# Patient Record
Sex: MALE
[De-identification: names, ages, dates, MRNs, and addresses within clinical notes are randomized per-mention and may not be internally consistent; named-entity substitution may affect disease eponyms.]

---

## 2017-01-24 ENCOUNTER — HOSPITAL (OUTPATIENT)
Dept: OTHER | Age: 57
End: 2017-01-24
Attending: FAMILY MEDICINE

## 2017-05-24 ENCOUNTER — DIAGNOSTIC TRANS (OUTPATIENT)
Dept: OTHER | Age: 57
End: 2017-05-24

## 2017-05-24 ENCOUNTER — HOSPITAL (OUTPATIENT)
Dept: OTHER | Age: 57
End: 2017-05-24
Attending: FAMILY MEDICINE

## 2017-05-24 LAB
25(OH)D3+25(OH)D2 SERPL-MCNC: 25.4 NG/ML (ref 30–100)
ALBUMIN SERPL-MCNC: 3.8 GM/DL (ref 3.6–5.1)
ALBUMIN/GLOB SERPL: 1 {RATIO} (ref 1–2.4)
ALP SERPL-CCNC: 85 UNIT/L (ref 45–117)
ALT SERPL-CCNC: 35 UNIT/L
ANALYZER ANC (IANC): NORMAL
ANION GAP SERPL CALC-SCNC: 14 MMOL/L (ref 10–20)
APPEARANCE UR: CLEAR
AST SERPL-CCNC: 16 UNIT/L
BASOPHILS # BLD: 0 THOUSAND/MCL (ref 0–0.3)
BASOPHILS NFR BLD: 0 %
BILIRUB SERPL-MCNC: 0.5 MG/DL (ref 0.2–1)
BILIRUB UR QL: NEGATIVE
BUN SERPL-MCNC: 14 MG/DL (ref 6–20)
BUN/CREAT SERPL: 18 (ref 7–25)
CALCIUM SERPL-MCNC: 9 MG/DL (ref 8.4–10.2)
CHLORIDE: 100 MMOL/L (ref 98–107)
CHOLEST SERPL-MCNC: 207 MG/DL
CHOLEST/HDLC SERPL: 6.5 {RATIO}
CO2 SERPL-SCNC: 29 MMOL/L (ref 21–32)
COLOR UR: ABNORMAL
CREAT 24H UR-MRATE: 312 MG/DL
CREAT SERPL-MCNC: 0.79 MG/DL (ref 0.67–1.17)
DIFFERENTIAL METHOD BLD: NORMAL
EOSINOPHIL # BLD: 0.4 THOUSAND/MCL (ref 0.1–0.5)
EOSINOPHIL NFR BLD: 4 %
ERYTHROCYTE [DISTWIDTH] IN BLOOD: 13.8 % (ref 11–15)
GLOBULIN SER-MCNC: 4 GM/DL (ref 2–4)
GLUCOSE SERPL-MCNC: 171 MG/DL (ref 65–99)
GLUCOSE UR-MCNC: 100 MG/DL
GLYCOHEMOGLOBIN: 7.1 % (ref 4.5–5.6)
HDLC SERPL-MCNC: 32 MG/DL
HEMATOCRIT: 46.4 % (ref 39–51)
HGB BLD-MCNC: 15.3 GM/DL (ref 13–17)
HGB UR QL: NEGATIVE
KETONES UR-MCNC: NEGATIVE MG/DL
LDLC SERPL CALC-MCNC: 124 MG/DL
LEUKOCYTE ESTERASE UR QL STRIP: NEGATIVE
LYMPHOCYTES # BLD: 2.3 THOUSAND/MCL (ref 1–4)
LYMPHOCYTES NFR BLD: 27 %
MAGNESIUM SERPL-MCNC: 2.2 MG/DL (ref 1.7–2.4)
MCH RBC QN AUTO: 32.8 PG (ref 26–34)
MCHC RBC AUTO-ENTMCNC: 33 GM/DL (ref 32–36.5)
MCV RBC AUTO: 99.6 FL (ref 78–100)
MICROALBUMIN UR-MCNC: 9.36 MG/DL
MICROALBUMIN/CREAT UR: 30 MCG/MG
MICROSCOPIC (MT): ABNORMAL
MONOCYTES # BLD: 0.7 THOUSAND/MCL (ref 0.3–0.9)
MONOCYTES NFR BLD: 9 %
NEUTROPHILS # BLD: 5 THOUSAND/MCL (ref 1.8–7.7)
NEUTROPHILS NFR BLD: 60 %
NEUTS SEG NFR BLD: NORMAL %
NITRITE UR QL: NEGATIVE
NONHDLC SERPL-MCNC: 175 MG/DL
PERCENT NRBC: NORMAL
PH UR: 5.5 UNIT (ref 5–7)
PHOSPHATE SERPL-MCNC: 3.6 MG/DL (ref 2.4–4.7)
PLATELET # BLD: 309 THOUSAND/MCL (ref 140–450)
POTASSIUM SERPL-SCNC: 4 MMOL/L (ref 3.4–5.1)
PROT SERPL-MCNC: 7.8 GM/DL (ref 6.4–8.2)
PROT UR QL: ABNORMAL MG/DL
PSA SERPL-MCNC: 0.31 NG/ML
RBC # BLD: 4.66 MILLION/MCL (ref 4.5–5.9)
SODIUM SERPL-SCNC: 139 MMOL/L (ref 135–145)
SP GR UR: 1.03 (ref 1–1.03)
SPECIMEN SOURCE: ABNORMAL
T3 SERPL-MCNC: 1.04 NG/ML (ref 0.6–1.81)
T4 SERPL-MCNC: 9.7 MCG/DL (ref 4.7–13.3)
TRIGLYCERIDE (TRIGP): 256 MG/DL
TSH SERPL-ACNC: 1.85 MCUNIT/ML (ref 0.35–5)
URNS CMNT MICRO: ABNORMAL
UROBILINOGEN UR QL: 0.2 MG/DL (ref 0–1)
WBC # BLD: 8.4 THOUSAND/MCL (ref 4.2–11)

## 2017-06-20 ENCOUNTER — IMAGING SERVICES (OUTPATIENT)
Dept: OTHER | Age: 57
End: 2017-06-20

## 2017-06-20 ENCOUNTER — HOSPITAL (OUTPATIENT)
Dept: OTHER | Age: 57
End: 2017-06-20

## 2017-06-20 ENCOUNTER — DIAGNOSTIC TRANS (OUTPATIENT)
Dept: OTHER | Age: 57
End: 2017-06-20

## 2017-12-22 ENCOUNTER — DIAGNOSTIC TRANS (OUTPATIENT)
Dept: OTHER | Age: 57
End: 2017-12-22

## 2017-12-22 ENCOUNTER — HOSPITAL (OUTPATIENT)
Dept: OTHER | Age: 57
End: 2017-12-22
Attending: FAMILY MEDICINE

## 2017-12-22 LAB
ALBUMIN SERPL-MCNC: 3.8 GM/DL (ref 3.6–5.1)
ALBUMIN/GLOB SERPL: 0.9 {RATIO} (ref 1–2.4)
ALP SERPL-CCNC: 81 UNIT/L (ref 45–117)
ALT SERPL-CCNC: 42 UNIT/L
ANALYZER ANC (IANC): ABNORMAL
ANION GAP SERPL CALC-SCNC: 11 MMOL/L (ref 10–20)
APPEARANCE UR: CLEAR
AST SERPL-CCNC: 19 UNIT/L
BILIRUB SERPL-MCNC: 0.3 MG/DL (ref 0.2–1)
BILIRUB UR QL: NEGATIVE
BUN SERPL-MCNC: 20 MG/DL (ref 6–20)
BUN/CREAT SERPL: 32 (ref 7–25)
CALCIUM SERPL-MCNC: 8.8 MG/DL (ref 8.4–10.2)
CHLORIDE: 104 MMOL/L (ref 98–107)
CHOLEST SERPL-MCNC: 246 MG/DL
CHOLEST/HDLC SERPL: 10.3 {RATIO}
CO2 SERPL-SCNC: 28 MMOL/L (ref 21–32)
COLOR UR: YELLOW
CREAT 24H UR-MRATE: 128 MG/DL
CREAT SERPL-MCNC: 0.62 MG/DL (ref 0.67–1.17)
ERYTHROCYTE [DISTWIDTH] IN BLOOD: 13.1 % (ref 11–15)
GLOBULIN SER-MCNC: 4.1 GM/DL (ref 2–4)
GLUCOSE SERPL-MCNC: 201 MG/DL (ref 65–99)
GLUCOSE UR-MCNC: >1000 MG/DL
GLYCOHEMOGLOBIN: 7.8 % (ref 4.5–5.6)
HDLC SERPL-MCNC: 24 MG/DL
HEMATOCRIT: 42.7 % (ref 39–51)
HGB BLD-MCNC: 14.5 GM/DL (ref 13–17)
HGB UR QL: NEGATIVE
KETONES UR-MCNC: NEGATIVE MG/DL
LDLC SERPL CALC-MCNC: ABNORMAL MG/DL
LEUKOCYTE ESTERASE UR QL STRIP: NEGATIVE
MCH RBC QN AUTO: 33.8 PG (ref 26–34)
MCHC RBC AUTO-ENTMCNC: 34 GM/DL (ref 32–36.5)
MCV RBC AUTO: 99.5 FL (ref 78–100)
MICROALBUMIN UR-MCNC: 0.81 MG/DL
MICROALBUMIN/CREAT UR: 6.3 MCG/MG
MICROSCOPIC (MT): ABNORMAL
NITRITE UR QL: NEGATIVE
NONHDLC SERPL-MCNC: 222 MG/DL
PH UR: 5.5 UNIT (ref 5–7)
PLATELET # BLD: 315 THOUSAND/MCL (ref 140–450)
POTASSIUM SERPL-SCNC: 4.5 MMOL/L (ref 3.4–5.1)
PROT SERPL-MCNC: 7.9 GM/DL (ref 6.4–8.2)
PROT UR QL: NEGATIVE MG/DL
RBC # BLD: 4.29 MILLION/MCL (ref 4.5–5.9)
SODIUM SERPL-SCNC: 138 MMOL/L (ref 135–145)
SP GR UR: 1.03 (ref 1–1.03)
SPECIMEN SOURCE: ABNORMAL
TRIGLYCERIDE (TRIGP): 552 MG/DL
URNS CMNT MICRO: ABNORMAL
UROBILINOGEN UR QL: 0.2 MG/DL (ref 0–1)
WBC # BLD: 9.2 THOUSAND/MCL (ref 4.2–11)

## 2018-04-23 ENCOUNTER — HOSPITAL (OUTPATIENT)
Dept: OTHER | Age: 58
End: 2018-04-23
Attending: FAMILY MEDICINE

## 2018-04-23 LAB
ANION GAP SERPL CALC-SCNC: 8 MMOL/L (ref 10–20)
BUN SERPL-MCNC: 16 MG/DL (ref 6–20)
BUN/CREAT SERPL: 26 (ref 7–25)
CALCIUM SERPL-MCNC: 8.9 MG/DL (ref 8.4–10.2)
CHLORIDE: 101 MMOL/L (ref 98–107)
CO2 SERPL-SCNC: 30 MMOL/L (ref 21–32)
CREAT SERPL-MCNC: 0.61 MG/DL (ref 0.67–1.17)
GLUCOSE SERPL-MCNC: 176 MG/DL (ref 65–99)
GLYCOHEMOGLOBIN: 8.6 % (ref 4.5–5.6)
POTASSIUM SERPL-SCNC: 3.8 MMOL/L (ref 3.4–5.1)
SODIUM SERPL-SCNC: 135 MMOL/L (ref 135–145)

## 2018-04-27 ENCOUNTER — HOSPITAL (OUTPATIENT)
Dept: OTHER | Age: 58
End: 2018-04-27
Attending: FAMILY MEDICINE

## 2018-05-31 ENCOUNTER — HOSPITAL (OUTPATIENT)
Dept: OTHER | Age: 58
End: 2018-05-31
Attending: FAMILY MEDICINE

## 2018-05-31 LAB
ALBUMIN SERPL-MCNC: 3.7 GM/DL (ref 3.6–5.1)
ALBUMIN/GLOB SERPL: 0.9 {RATIO} (ref 1–2.4)
ALP SERPL-CCNC: 81 UNIT/L (ref 45–117)
ALT SERPL-CCNC: 36 UNIT/L
ANION GAP SERPL CALC-SCNC: 11 MMOL/L (ref 10–20)
APPEARANCE UR: CLEAR
AST SERPL-CCNC: 18 UNIT/L
BILIRUB SERPL-MCNC: 0.7 MG/DL (ref 0.2–1)
BILIRUB UR QL: NEGATIVE
BUN SERPL-MCNC: 10 MG/DL (ref 6–20)
BUN/CREAT SERPL: 16 (ref 7–25)
CALCIUM SERPL-MCNC: 9.3 MG/DL (ref 8.4–10.2)
CHLORIDE: 99 MMOL/L (ref 98–107)
CHOLEST SERPL-MCNC: 223 MG/DL
CHOLEST/HDLC SERPL: 8.3 {RATIO}
CO2 SERPL-SCNC: 30 MMOL/L (ref 21–32)
COLOR UR: YELLOW
CREAT 24H UR-MRATE: 105 MG/DL
CREAT SERPL-MCNC: 0.62 MG/DL (ref 0.67–1.17)
GLOBULIN SER-MCNC: 3.9 GM/DL (ref 2–4)
GLUCOSE SERPL-MCNC: 159 MG/DL (ref 65–99)
GLUCOSE UR-MCNC: 100 MG/DL
GLYCOHEMOGLOBIN: 7.8 % (ref 4.5–5.6)
HDLC SERPL-MCNC: 27 MG/DL
HGB UR QL: NEGATIVE
KETONES UR-MCNC: NEGATIVE MG/DL
LDLC SERPL CALC-MCNC: 133 MG/DL
LEUKOCYTE ESTERASE UR QL STRIP: NEGATIVE
MICROALBUMIN UR-MCNC: 0.76 MG/DL
MICROALBUMIN/CREAT UR: 7.2 MG/GM
MICROSCOPIC (MT): ABNORMAL
NITRITE UR QL: NEGATIVE
NONHDLC SERPL-MCNC: 196 MG/DL
PH UR: 6.5 UNIT (ref 5–7)
POTASSIUM SERPL-SCNC: 4.2 MMOL/L (ref 3.4–5.1)
PROT SERPL-MCNC: 7.6 GM/DL (ref 6.4–8.2)
PROT UR QL: NEGATIVE MG/DL
SODIUM SERPL-SCNC: 136 MMOL/L (ref 135–145)
SP GR UR: 1.02 (ref 1–1.03)
SPECIMEN SOURCE: ABNORMAL
TRIGLYCERIDE (TRIGP): 316 MG/DL
URNS CMNT MICRO: ABNORMAL
UROBILINOGEN UR QL: 0.2 MG/DL (ref 0–1)

## 2018-10-23 ENCOUNTER — HOSPITAL (OUTPATIENT)
Dept: OTHER | Age: 58
End: 2018-10-23
Attending: FAMILY MEDICINE

## 2018-10-23 LAB
ALBUMIN SERPL-MCNC: 3.7 GM/DL (ref 3.6–5.1)
ALBUMIN/GLOB SERPL: 0.9 {RATIO} (ref 1–2.4)
ALP SERPL-CCNC: 84 UNIT/L (ref 45–117)
ALT SERPL-CCNC: 29 UNIT/L
AMORPH SED URNS QL MICRO: ABNORMAL
ANION GAP SERPL CALC-SCNC: 3 MMOL/L (ref 10–20)
APPEARANCE UR: ABNORMAL
AST SERPL-CCNC: 14 UNIT/L
BACTERIA #/AREA URNS HPF: ABNORMAL /HPF
BILIRUB SERPL-MCNC: 0.7 MG/DL (ref 0.2–1)
BILIRUB UR QL: NEGATIVE
BUN SERPL-MCNC: 17 MG/DL (ref 6–20)
BUN/CREAT SERPL: 22 (ref 7–25)
CALCIUM SERPL-MCNC: 9.1 MG/DL (ref 8.4–10.2)
CAOX CRY URNS QL MICRO: ABNORMAL
CHLORIDE: 106 MMOL/L (ref 98–107)
CO2 SERPL-SCNC: 32 MMOL/L (ref 21–32)
COLOR UR: YELLOW
CREAT 24H UR-MRATE: 359 MG/DL
CREAT SERPL-MCNC: 0.77 MG/DL (ref 0.67–1.17)
EPITH CASTS #/AREA URNS LPF: ABNORMAL /[LPF]
FATTY CASTS #/AREA URNS LPF: ABNORMAL /[LPF]
GLOBULIN SER-MCNC: 3.9 GM/DL (ref 2–4)
GLUCOSE SERPL-MCNC: 175 MG/DL (ref 65–99)
GLUCOSE UR-MCNC: 150 MG/DL
GLYCOHEMOGLOBIN: 8.4 % (ref 4.5–5.6)
GRAN CASTS #/AREA URNS LPF: ABNORMAL /[LPF]
HGB UR QL: NEGATIVE
HYALINE CASTS #/AREA URNS LPF: ABNORMAL /LPF (ref 0–5)
KETONES UR-MCNC: ABNORMAL MG/DL
LEUKOCYTE ESTERASE UR QL STRIP: NEGATIVE
MICROALBUMIN UR-MCNC: 6.38 MG/DL
MICROALBUMIN/CREAT UR: 17.8 MG/GM
MIXED CELL CASTS #/AREA URNS LPF: ABNORMAL /[LPF]
MUCOUS THREADS URNS QL MICRO: PRESENT
NITRITE UR QL: NEGATIVE
PH UR: 5 UNIT (ref 5–7)
POTASSIUM SERPL-SCNC: 4.6 MMOL/L (ref 3.4–5.1)
PROT SERPL-MCNC: 7.6 GM/DL (ref 6.4–8.2)
PROT UR QL: 30 MG/DL
RBC #/AREA URNS HPF: ABNORMAL /HPF (ref 0–3)
RBC CASTS #/AREA URNS LPF: ABNORMAL /[LPF]
RENAL EPI CELLS #/AREA URNS HPF: ABNORMAL /[HPF]
SODIUM SERPL-SCNC: 136 MMOL/L (ref 135–145)
SP GR UR: 1.03 (ref 1–1.03)
SPECIMEN SOURCE: ABNORMAL
SPERM URNS QL MICRO: ABNORMAL
SQUAMOUS #/AREA URNS HPF: ABNORMAL /HPF (ref 0–5)
T VAGINALIS URNS QL MICRO: ABNORMAL
TRI-PHOS CRY URNS QL MICRO: ABNORMAL
URATE CRY URNS QL MICRO: ABNORMAL
UROBILINOGEN UR QL: 2 MG/DL (ref 0–1)
WAXY CASTS #/AREA URNS LPF: ABNORMAL /[LPF]
WBC #/AREA URNS HPF: ABNORMAL /HPF (ref 0–5)
WBC CASTS #/AREA URNS LPF: ABNORMAL /[LPF]
YEAST HYPHAE URNS QL MICRO: ABNORMAL
YEAST URNS QL MICRO: ABNORMAL

## 2019-01-11 ENCOUNTER — DIAGNOSTIC TRANS (OUTPATIENT)
Dept: OTHER | Age: 59
End: 2019-01-11

## 2019-01-11 ENCOUNTER — HOSPITAL (OUTPATIENT)
Dept: OTHER | Age: 59
End: 2019-01-11
Attending: FAMILY MEDICINE

## 2019-01-11 LAB
25(OH)D3+25(OH)D2 SERPL-MCNC: 13.9 NG/ML (ref 30–100)
25(OH)D3+25(OH)D2 SERPL-MCNC: 13.9 NG/ML (ref 30–100)
25(OH)D3+25(OH)D2 SERPL-MCNC: ABNORMAL NG/ML
ALBUMIN SERPL-MCNC: 3.8 G/DL (ref 3.6–5.1)
ALBUMIN SERPL-MCNC: 3.8 GM/DL (ref 3.6–5.1)
ALBUMIN/GLOB SERPL: 1 {RATIO} (ref 1–2.4)
ALBUMIN/GLOB SERPL: 1 {RATIO} (ref 1–2.4)
ALP SERPL-CCNC: 81 UNIT/L (ref 45–117)
ALP SERPL-CCNC: 81 UNITS/L (ref 45–117)
ALT SERPL-CCNC: 31 UNIT/L
ALT SERPL-CCNC: 31 UNITS/L
AMORPH SED URNS QL MICRO: ABNORMAL
AMORPH SED URNS QL MICRO: ABNORMAL
ANALYZER ANC (IANC): ABNORMAL
ANALYZER ANC (IANC): ABNORMAL
ANION GAP SERPL CALC-SCNC: 8 MMOL/L (ref 10–20)
ANION GAP SERPL CALC-SCNC: 8 MMOL/L (ref 10–20)
APPEARANCE UR: ABNORMAL
APPEARANCE UR: ABNORMAL
AST SERPL-CCNC: 14 UNIT/L
AST SERPL-CCNC: 14 UNITS/L
BACTERIA #/AREA URNS HPF: ABNORMAL /HPF
BACTERIA #/AREA URNS HPF: ABNORMAL /HPF
BILIRUB SERPL-MCNC: 0.5 MG/DL (ref 0.2–1)
BILIRUB SERPL-MCNC: 0.5 MG/DL (ref 0.2–1)
BILIRUB UR QL: NEGATIVE
BILIRUB UR QL: NEGATIVE
BUN SERPL-MCNC: 14 MG/DL (ref 6–20)
BUN SERPL-MCNC: 14 MG/DL (ref 6–20)
BUN/CREAT SERPL: 21 (ref 7–25)
BUN/CREAT SERPL: 21 (ref 7–25)
CALCIUM SERPL-MCNC: 8.7 MG/DL (ref 8.4–10.2)
CALCIUM SERPL-MCNC: 8.7 MG/DL (ref 8.4–10.2)
CAOX CRY URNS QL MICRO: ABNORMAL
CAOX CRY URNS QL MICRO: ABNORMAL
CHLORIDE SERPL-SCNC: 105 MMOL/L (ref 98–107)
CHLORIDE: 105 MMOL/L (ref 98–107)
CHOLEST SERPL-MCNC: 212 MG/DL
CHOLEST SERPL-MCNC: 212 MG/DL
CHOLEST SERPL-MCNC: ABNORMAL MG/DL
CHOLEST/HDLC SERPL: 7.1 {RATIO}
CHOLEST/HDLC SERPL: 7.1 {RATIO}
CO2 SERPL-SCNC: 30 MMOL/L (ref 21–32)
CO2 SERPL-SCNC: 30 MMOL/L (ref 21–32)
COLOR UR: ABNORMAL
COLOR UR: ABNORMAL
CREAT 24H UR-MRATE: 525 MG/DL
CREAT SERPL-MCNC: 0.66 MG/DL (ref 0.67–1.17)
CREAT SERPL-MCNC: 0.66 MG/DL (ref 0.67–1.17)
EPITH CASTS #/AREA URNS LPF: ABNORMAL /[LPF]
EPITH CASTS #/AREA URNS LPF: ABNORMAL /[LPF]
ERYTHROCYTE [DISTWIDTH] IN BLOOD: 12.4 % (ref 11–15)
ERYTHROCYTE [DISTWIDTH] IN BLOOD: 12.4 % (ref 11–15)
FATTY CASTS #/AREA URNS LPF: ABNORMAL /[LPF]
FATTY CASTS #/AREA URNS LPF: ABNORMAL /[LPF]
GLOBULIN SER-MCNC: 3.8 G/DL (ref 2–4)
GLOBULIN SER-MCNC: 3.8 GM/DL (ref 2–4)
GLUCOSE SERPL-MCNC: 175 MG/DL (ref 65–99)
GLUCOSE SERPL-MCNC: 175 MG/DL (ref 65–99)
GLUCOSE UR-MCNC: ABNORMAL MG/DL
GLUCOSE UR-MCNC: ABNORMAL MG/DL
GLYCOHEMOGLOBIN: 8.4 % (ref 4.5–5.6)
GRAN CASTS #/AREA URNS LPF: ABNORMAL /[LPF]
GRAN CASTS #/AREA URNS LPF: ABNORMAL /[LPF]
HBA1C MFR BLD: 10.0           24 %
HBA1C MFR BLD: 6.0            126 %
HBA1C MFR BLD: 6.5            14 %
HBA1C MFR BLD: 7.0            154 %
HBA1C MFR BLD: 7.5            169 %
HBA1C MFR BLD: 8.0            183 %
HBA1C MFR BLD: 8.4 % (ref 4.5–5.6)
HBA1C MFR BLD: 8.5            197 %
HBA1C MFR BLD: 9.0            212 %
HBA1C MFR BLD: 9.5            226 %
HBA1C MFR BLD: ABNORMAL %
HCT VFR BLD CALC: 42 % (ref 39–51)
HDLC SERPL-MCNC: 30 MG/DL
HDLC SERPL-MCNC: 30 MG/DL
HDLC SERPL-MCNC: ABNORMAL MG/DL
HEMATOCRIT: 42 % (ref 39–51)
HGB BLD-MCNC: 14.4 G/DL (ref 13–17)
HGB BLD-MCNC: 14.4 GM/DL (ref 13–17)
HGB UR QL: NEGATIVE
HGB UR QL: NEGATIVE
HYALINE CASTS #/AREA URNS LPF: ABNORMAL /LPF (ref 0–5)
HYALINE CASTS #/AREA URNS LPF: ABNORMAL /LPF (ref 0–5)
KETONES UR-MCNC: ABNORMAL MG/DL
KETONES UR-MCNC: ABNORMAL MG/DL
LDLC SERPL CALC-MCNC: 138 MG/DL
LDLC SERPL CALC-MCNC: 138 MG/DL
LDLC SERPL CALC-MCNC: ABNORMAL MG/DL
LEUKOCYTE ESTERASE UR QL STRIP: NEGATIVE
LEUKOCYTE ESTERASE UR QL STRIP: NEGATIVE
MCH RBC QN AUTO: 32.6 PG (ref 26–34)
MCH RBC QN AUTO: 32.6 PG (ref 26–34)
MCHC RBC AUTO-ENTMCNC: 34.3 G/DL (ref 32–36.5)
MCHC RBC AUTO-ENTMCNC: 34.3 GM/DL (ref 32–36.5)
MCV RBC AUTO: 95 FL (ref 78–100)
MCV RBC AUTO: 95 FL (ref 78–100)
MICROALBUMIN UR-MCNC: 15 MG/DL
MICROALBUMIN/CREAT UR: 28.6 MG/GM
MICROSCOPIC (MT): ABNORMAL
MICROSCOPIC (MT): ABNORMAL
MIXED CELL CASTS #/AREA URNS LPF: ABNORMAL /[LPF]
MIXED CELL CASTS #/AREA URNS LPF: ABNORMAL /[LPF]
MUCOUS THREADS URNS QL MICRO: PRESENT
MUCOUS THREADS URNS QL MICRO: PRESENT
NITRITE UR QL: NEGATIVE
NITRITE UR QL: NEGATIVE
NONHDLC SERPL-MCNC: 182 MG/DL
NONHDLC SERPL-MCNC: 182 MG/DL
NONHDLC SERPL-MCNC: ABNORMAL MG/DL
NRBC (NRBCRE): 0 /100 WBC
NRBC (NRBCRE): 0 /100 WBC
PH UR: 5 UNIT (ref 5–7)
PH UR: 5 UNITS (ref 5–7)
PLATELET # BLD: 285 K/MCL (ref 140–450)
PLATELET # BLD: 285 THOUSAND/MCL (ref 140–450)
POTASSIUM SERPL-SCNC: 3.9 MMOL/L (ref 3.4–5.1)
POTASSIUM SERPL-SCNC: 3.9 MMOL/L (ref 3.4–5.1)
PROT SERPL-MCNC: 7.6 G/DL (ref 6.4–8.2)
PROT SERPL-MCNC: 7.6 GM/DL (ref 6.4–8.2)
PROT UR QL: 30 MG/DL
PROT UR QL: 30 MG/DL
PSA SERPL-MCNC: 0.64 NG/ML
PSA SERPL-MCNC: 0.64 NG/ML
PSA SERPL-MCNC: NORMAL NG/ML
RBC # BLD: 4.42 MIL/MCL (ref 4.5–5.9)
RBC # BLD: 4.42 MILLION/MCL (ref 4.5–5.9)
RBC #/AREA URNS HPF: ABNORMAL /HPF (ref 0–3)
RBC #/AREA URNS HPF: ABNORMAL /HPF (ref 0–3)
RBC CASTS #/AREA URNS LPF: ABNORMAL /[LPF]
RBC CASTS #/AREA URNS LPF: ABNORMAL /[LPF]
RENAL EPI CELLS #/AREA URNS HPF: ABNORMAL /[HPF]
RENAL EPI CELLS #/AREA URNS HPF: ABNORMAL /[HPF]
SODIUM SERPL-SCNC: 139 MMOL/L (ref 135–145)
SODIUM SERPL-SCNC: 139 MMOL/L (ref 135–145)
SP GR UR: >1.03 (ref 1–1.03)
SP GR UR: >1.03 (ref 1–1.03)
SPECIMEN SOURCE: ABNORMAL
SPECIMEN SOURCE: ABNORMAL
SPERM URNS QL MICRO: ABNORMAL
SPERM URNS QL MICRO: ABNORMAL
SQUAMOUS #/AREA URNS HPF: ABNORMAL /HPF (ref 0–5)
SQUAMOUS #/AREA URNS HPF: ABNORMAL /HPF (ref 0–5)
T VAGINALIS URNS QL MICRO: ABNORMAL
T VAGINALIS URNS QL MICRO: ABNORMAL
TRI-PHOS CRY URNS QL MICRO: ABNORMAL
TRI-PHOS CRY URNS QL MICRO: ABNORMAL
TRIGL SERPL-MCNC: 219 MG/DL
TRIGL SERPL-MCNC: ABNORMAL MG/DL
TRIGLYCERIDE (TRIGP): 219 MG/DL
TSH SERPL-ACNC: 1.05 MCUNIT/ML (ref 0.35–5)
TSH SERPL-ACNC: 1.05 MCUNITS/ML (ref 0.35–5)
URATE CRY URNS QL MICRO: ABNORMAL
URATE CRY URNS QL MICRO: ABNORMAL
UROBILINOGEN UR QL: 0.2 MG/DL (ref 0–1)
UROBILINOGEN UR QL: 0.2 MG/DL (ref 0–1)
WAXY CASTS #/AREA URNS LPF: ABNORMAL /[LPF]
WAXY CASTS #/AREA URNS LPF: ABNORMAL /[LPF]
WBC # BLD: 9.4 K/MCL (ref 4.2–11)
WBC # BLD: 9.4 THOUSAND/MCL (ref 4.2–11)
WBC #/AREA URNS HPF: ABNORMAL /HPF (ref 0–5)
WBC #/AREA URNS HPF: ABNORMAL /HPF (ref 0–5)
WBC CASTS #/AREA URNS LPF: ABNORMAL /[LPF]
WBC CASTS #/AREA URNS LPF: ABNORMAL /[LPF]
YEAST HYPHAE URNS QL MICRO: ABNORMAL
YEAST HYPHAE URNS QL MICRO: ABNORMAL
YEAST URNS QL MICRO: ABNORMAL
YEAST URNS QL MICRO: ABNORMAL

## 2019-01-12 LAB
CREAT UR-MCNC: 525 MG/DL
MICROALBUMIN UR-MCNC: 15 MG/DL
MICROALBUMIN/CREAT UR: 28.6 MG/G

## 2021-02-09 ENCOUNTER — HISTORICAL (OUTPATIENT)
Dept: LAB | Facility: HOSPITAL | Age: 61
End: 2021-02-09

## 2021-02-09 LAB
ALBUMIN SERPL-MCNC: 3.8 GM/DL (ref 3.5–5)
ALBUMIN/GLOB SERPL: 1.1 RATIO (ref 1.1–2)
ALP SERPL-CCNC: 74 UNIT/L (ref 40–150)
ALT SERPL-CCNC: 22 UNIT/L (ref 0–55)
APPEARANCE, UA: CLEAR
AST SERPL-CCNC: 15 UNIT/L (ref 5–34)
BACTERIA SPEC CULT: ABNORMAL
BILIRUB SERPL-MCNC: 0.5 MG/DL
BILIRUB UR QL STRIP: NEGATIVE
BILIRUBIN DIRECT+TOT PNL SERPL-MCNC: 0.1 MG/DL (ref 0–0.5)
BILIRUBIN DIRECT+TOT PNL SERPL-MCNC: 0.4 MG/DL (ref 0–0.8)
BUN SERPL-MCNC: 14.6 MG/DL (ref 8.4–25.7)
CALCIUM SERPL-MCNC: 8.8 MG/DL (ref 8.4–10.2)
CHLORIDE SERPL-SCNC: 102 MMOL/L (ref 98–107)
CHOLEST SERPL-MCNC: 209 MG/DL
CHOLEST/HDLC SERPL: 6 {RATIO} (ref 0–5)
CO2 SERPL-SCNC: 28 MMOL/L (ref 22–29)
COLOR UR: ABNORMAL
CREAT SERPL-MCNC: 0.68 MG/DL (ref 0.73–1.18)
ERYTHROCYTE [DISTWIDTH] IN BLOOD BY AUTOMATED COUNT: 12.6 % (ref 11.5–17)
EST. AVERAGE GLUCOSE BLD GHB EST-MCNC: 191.5 MG/DL
GLOBULIN SER-MCNC: 3.5 GM/DL (ref 2.4–3.5)
GLUCOSE (UA): 500
GLUCOSE SERPL-MCNC: 220 MG/DL (ref 74–100)
HBA1C MFR BLD: 8.3 %
HCT VFR BLD AUTO: 44.1 % (ref 42–52)
HDLC SERPL-MCNC: 33 MG/DL (ref 35–60)
HGB BLD-MCNC: 14.6 GM/DL (ref 14–18)
HGB UR QL STRIP: NEGATIVE
KETONES UR QL STRIP: NEGATIVE
LDLC SERPL CALC-MCNC: 137 MG/DL (ref 50–140)
LEUKOCYTE ESTERASE UR QL STRIP: NEGATIVE
MCH RBC QN AUTO: 32.5 PG (ref 27–31)
MCHC RBC AUTO-ENTMCNC: 33.1 GM/DL (ref 33–36)
MCV RBC AUTO: 98.2 FL (ref 80–94)
MUCOUS THREADS URNS QL MICRO: ABNORMAL
NITRITE UR QL STRIP: NEGATIVE
PH UR STRIP: 5.5 [PH] (ref 5–9)
PLATELET # BLD AUTO: 311 X10(3)/MCL (ref 130–400)
PMV BLD AUTO: 10.5 FL (ref 9.4–12.4)
POTASSIUM SERPL-SCNC: 3.8 MMOL/L (ref 3.5–5.1)
PROT SERPL-MCNC: 7.3 GM/DL (ref 6.4–8.3)
PROT UR QL STRIP: 30
PSA SERPL-MCNC: 2.56 NG/ML
RBC # BLD AUTO: 4.49 X10(6)/MCL (ref 4.7–6.1)
RBC #/AREA URNS HPF: ABNORMAL /HPF
SODIUM SERPL-SCNC: 139 MMOL/L (ref 136–145)
SP GR UR STRIP: >=1.03 (ref 1–1.03)
SQUAMOUS EPITHELIAL, UA: ABNORMAL
TRIGL SERPL-MCNC: 197 MG/DL (ref 34–140)
TSH SERPL-ACNC: 1.38 UIU/ML (ref 0.35–4.94)
UROBILINOGEN UR STRIP-ACNC: 0.2
VLDLC SERPL CALC-MCNC: 39 MG/DL
WBC # SPEC AUTO: 10.8 X10(3)/MCL (ref 4.5–11.5)
WBC #/AREA URNS HPF: ABNORMAL /HPF

## 2021-12-23 ENCOUNTER — HISTORICAL (OUTPATIENT)
Dept: LAB | Facility: HOSPITAL | Age: 61
End: 2021-12-23

## 2021-12-23 LAB
ALBUMIN SERPL-MCNC: 4.1 GM/DL (ref 3.5–5)
ALBUMIN/GLOB SERPL: 1.3 RATIO (ref 1.1–2)
ALP SERPL-CCNC: 71 UNIT/L (ref 40–150)
ALT SERPL-CCNC: 38 UNIT/L (ref 0–55)
AST SERPL-CCNC: 17 UNIT/L (ref 5–34)
BILIRUB SERPL-MCNC: 0.6 MG/DL
BILIRUBIN DIRECT+TOT PNL SERPL-MCNC: 0.2 MG/DL (ref 0–0.5)
BILIRUBIN DIRECT+TOT PNL SERPL-MCNC: 0.4 MG/DL (ref 0–0.8)
BUN SERPL-MCNC: 12.2 MG/DL (ref 8.4–25.7)
CALCIUM SERPL-MCNC: 9.6 MG/DL (ref 8.7–10.5)
CHLORIDE SERPL-SCNC: 102 MMOL/L (ref 98–107)
CO2 SERPL-SCNC: 27 MMOL/L (ref 22–29)
CREAT SERPL-MCNC: 0.72 MG/DL (ref 0.73–1.18)
EST. AVERAGE GLUCOSE BLD GHB EST-MCNC: 200.1 MG/DL
GLOBULIN SER-MCNC: 3.2 GM/DL (ref 2.4–3.5)
GLUCOSE SERPL-MCNC: 218 MG/DL (ref 74–100)
HBA1C MFR BLD: 8.6 %
POTASSIUM SERPL-SCNC: 4.3 MMOL/L (ref 3.5–5.1)
PROT SERPL-MCNC: 7.3 GM/DL (ref 6.4–8.3)
SODIUM SERPL-SCNC: 140 MMOL/L (ref 136–145)

## 2022-06-30 ENCOUNTER — LAB VISIT (OUTPATIENT)
Dept: LAB | Facility: HOSPITAL | Age: 62
End: 2022-06-30
Attending: FAMILY MEDICINE
Payer: MEDICAID

## 2022-06-30 DIAGNOSIS — E11.9 DIABETES MELLITUS WITHOUT COMPLICATION: Primary | ICD-10-CM

## 2022-06-30 LAB
ALBUMIN SERPL-MCNC: 3.9 GM/DL (ref 3.4–4.8)
ALBUMIN/GLOB SERPL: 1.3 RATIO (ref 1.1–2)
ALP SERPL-CCNC: 60 UNIT/L (ref 40–150)
ALT SERPL-CCNC: 28 UNIT/L (ref 0–55)
APPEARANCE UR: ABNORMAL
AST SERPL-CCNC: 16 UNIT/L (ref 5–34)
BACTERIA #/AREA URNS AUTO: ABNORMAL /HPF
BASOPHILS # BLD AUTO: 0.08 X10(3)/MCL (ref 0–0.2)
BASOPHILS NFR BLD AUTO: 0.7 %
BILIRUB UR QL STRIP.AUTO: NEGATIVE MG/DL
BILIRUBIN DIRECT+TOT PNL SERPL-MCNC: 0.5 MG/DL
BUN SERPL-MCNC: 16 MG/DL (ref 8.4–25.7)
CALCIUM SERPL-MCNC: 9.5 MG/DL (ref 8.8–10)
CHLORIDE SERPL-SCNC: 104 MMOL/L (ref 98–107)
CHOLEST SERPL-MCNC: 127 MG/DL
CHOLEST/HDLC SERPL: 3 {RATIO} (ref 0–5)
CO2 SERPL-SCNC: 26 MMOL/L (ref 23–31)
COLOR UR AUTO: ABNORMAL
CREAT SERPL-MCNC: 0.77 MG/DL (ref 0.73–1.18)
EOSINOPHIL # BLD AUTO: 0.36 X10(3)/MCL (ref 0–0.9)
EOSINOPHIL NFR BLD AUTO: 3 %
ERYTHROCYTE [DISTWIDTH] IN BLOOD BY AUTOMATED COUNT: 12.6 % (ref 11.5–17)
EST. AVERAGE GLUCOSE BLD GHB EST-MCNC: 188.6 MG/DL
GLOBULIN SER-MCNC: 3 GM/DL (ref 2.4–3.5)
GLUCOSE SERPL-MCNC: 182 MG/DL (ref 82–115)
GLUCOSE UR QL STRIP.AUTO: 250 MG/DL
HBA1C MFR BLD: 8.2 %
HCT VFR BLD AUTO: 42.8 % (ref 42–52)
HDLC SERPL-MCNC: 37 MG/DL (ref 35–60)
HGB BLD-MCNC: 13.8 GM/DL (ref 14–18)
IMM GRANULOCYTES # BLD AUTO: 0.04 X10(3)/MCL (ref 0–0.02)
IMM GRANULOCYTES NFR BLD AUTO: 0.3 % (ref 0–0.43)
KETONES UR QL STRIP.AUTO: NEGATIVE MG/DL
LDLC SERPL CALC-MCNC: 58 MG/DL (ref 50–140)
LEUKOCYTE ESTERASE UR QL STRIP.AUTO: NEGATIVE UNIT/L
LYMPHOCYTES # BLD AUTO: 3.06 X10(3)/MCL (ref 0.6–4.6)
LYMPHOCYTES NFR BLD AUTO: 25.4 %
MCH RBC QN AUTO: 30.9 PG (ref 27–31)
MCHC RBC AUTO-ENTMCNC: 32.2 MG/DL (ref 33–36)
MCV RBC AUTO: 96 FL (ref 80–94)
MONOCYTES # BLD AUTO: 1.02 X10(3)/MCL (ref 0.1–1.3)
MONOCYTES NFR BLD AUTO: 8.5 %
MUCOUS THREADS URNS QL MICRO: ABNORMAL /LPF
NEUTROPHILS # BLD AUTO: 7.5 X10(3)/MCL (ref 2.1–9.2)
NEUTROPHILS NFR BLD AUTO: 62.1 %
NITRITE UR QL STRIP.AUTO: NEGATIVE
PH UR STRIP.AUTO: 5.5 [PH]
PLATELET # BLD AUTO: 364 X10(3)/MCL (ref 130–400)
PMV BLD AUTO: 10.6 FL (ref 7.4–10.4)
POTASSIUM SERPL-SCNC: 3.6 MMOL/L (ref 3.5–5.1)
PROT SERPL-MCNC: 6.9 GM/DL (ref 5.8–7.6)
PROT UR QL STRIP.AUTO: NEGATIVE MG/DL
PSA SERPL-MCNC: 0.56 NG/ML
RBC # BLD AUTO: 4.46 X10(6)/MCL (ref 4.7–6.1)
RBC #/AREA URNS AUTO: ABNORMAL /HPF
RBC UR QL AUTO: NEGATIVE UNIT/L
SODIUM SERPL-SCNC: 141 MMOL/L (ref 136–145)
SP GR UR STRIP.AUTO: >=1.03
SPERM URNS QL MICRO: ABNORMAL /HPF
SQUAMOUS #/AREA URNS AUTO: ABNORMAL /HPF
TRIGL SERPL-MCNC: 162 MG/DL (ref 34–140)
TSH SERPL-ACNC: 1.5 UIU/ML (ref 0.35–4.94)
UROBILINOGEN UR STRIP-ACNC: 0.2 MG/DL
VLDLC SERPL CALC-MCNC: 32 MG/DL
WBC # SPEC AUTO: 12 X10(3)/MCL (ref 4.5–11.5)
WBC #/AREA URNS AUTO: ABNORMAL /HPF

## 2022-06-30 PROCEDURE — 80053 COMPREHEN METABOLIC PANEL: CPT

## 2022-06-30 PROCEDURE — 84443 ASSAY THYROID STIM HORMONE: CPT

## 2022-06-30 PROCEDURE — 36415 COLL VENOUS BLD VENIPUNCTURE: CPT

## 2022-06-30 PROCEDURE — 80061 LIPID PANEL: CPT

## 2022-06-30 PROCEDURE — 84153 ASSAY OF PSA TOTAL: CPT

## 2022-06-30 PROCEDURE — 85025 COMPLETE CBC W/AUTO DIFF WBC: CPT

## 2022-06-30 PROCEDURE — 81001 URINALYSIS AUTO W/SCOPE: CPT

## 2022-06-30 PROCEDURE — 83036 HEMOGLOBIN GLYCOSYLATED A1C: CPT

## 2022-11-30 ENCOUNTER — LAB VISIT (OUTPATIENT)
Dept: LAB | Facility: HOSPITAL | Age: 62
End: 2022-11-30
Attending: FAMILY MEDICINE
Payer: MEDICAID

## 2022-11-30 DIAGNOSIS — E78.5 HYPERLIPIDEMIA, UNSPECIFIED HYPERLIPIDEMIA TYPE: ICD-10-CM

## 2022-11-30 DIAGNOSIS — G47.00 INSOMNIA WITH SLEEP APNEA: ICD-10-CM

## 2022-11-30 DIAGNOSIS — K62.5 HEMORRHAGE OF RECTUM AND ANUS: ICD-10-CM

## 2022-11-30 DIAGNOSIS — G47.30 INSOMNIA WITH SLEEP APNEA: ICD-10-CM

## 2022-11-30 DIAGNOSIS — E11.9 DIABETES MELLITUS WITHOUT COMPLICATION: ICD-10-CM

## 2022-11-30 DIAGNOSIS — K63.5 HYPERPLASTIC POLYP OF INTESTINE: ICD-10-CM

## 2022-11-30 DIAGNOSIS — I48.91 ATRIAL FIBRILLATION: Primary | ICD-10-CM

## 2022-11-30 DIAGNOSIS — I10 ESSENTIAL HYPERTENSION, MALIGNANT: ICD-10-CM

## 2022-11-30 DIAGNOSIS — M79.645 PAIN IN FINGER OF LEFT HAND: ICD-10-CM

## 2022-11-30 LAB
ALBUMIN SERPL-MCNC: 4.2 GM/DL (ref 3.4–4.8)
ALBUMIN/GLOB SERPL: 1.2 RATIO (ref 1.1–2)
ALP SERPL-CCNC: 69 UNIT/L (ref 40–150)
ALT SERPL-CCNC: 26 UNIT/L (ref 0–55)
AST SERPL-CCNC: 14 UNIT/L (ref 5–34)
BILIRUBIN DIRECT+TOT PNL SERPL-MCNC: 0.8 MG/DL
BUN SERPL-MCNC: 20.4 MG/DL (ref 8.4–25.7)
CALCIUM SERPL-MCNC: 9.8 MG/DL (ref 8.8–10)
CHLORIDE SERPL-SCNC: 102 MMOL/L (ref 98–107)
CO2 SERPL-SCNC: 25 MMOL/L (ref 23–31)
CREAT SERPL-MCNC: 0.87 MG/DL (ref 0.73–1.18)
EST. AVERAGE GLUCOSE BLD GHB EST-MCNC: 211.6 MG/DL
GFR SERPLBLD CREATININE-BSD FMLA CKD-EPI: >60 MLS/MIN/1.73/M2
GLOBULIN SER-MCNC: 3.4 GM/DL (ref 2.4–3.5)
GLUCOSE SERPL-MCNC: 235 MG/DL (ref 82–115)
HBA1C MFR BLD: 9 %
POTASSIUM SERPL-SCNC: 4 MMOL/L (ref 3.5–5.1)
PROT SERPL-MCNC: 7.6 GM/DL (ref 5.8–7.6)
SODIUM SERPL-SCNC: 140 MMOL/L (ref 136–145)

## 2022-11-30 PROCEDURE — 36415 COLL VENOUS BLD VENIPUNCTURE: CPT

## 2022-11-30 PROCEDURE — 80053 COMPREHEN METABOLIC PANEL: CPT

## 2022-11-30 PROCEDURE — 83036 HEMOGLOBIN GLYCOSYLATED A1C: CPT

## 2023-11-14 ENCOUNTER — LAB VISIT (OUTPATIENT)
Dept: LAB | Facility: HOSPITAL | Age: 63
End: 2023-11-14
Attending: FAMILY MEDICINE
Payer: MEDICAID

## 2023-11-14 DIAGNOSIS — M79.645 PAIN IN FINGER OF LEFT HAND: ICD-10-CM

## 2023-11-14 DIAGNOSIS — I48.91 ATRIAL FIBRILLATION: Primary | ICD-10-CM

## 2023-11-14 DIAGNOSIS — I10 ESSENTIAL HYPERTENSION, MALIGNANT: ICD-10-CM

## 2023-11-14 DIAGNOSIS — Z12.5 SPECIAL SCREENING FOR MALIGNANT NEOPLASM OF PROSTATE: ICD-10-CM

## 2023-11-14 DIAGNOSIS — E78.5 HYPERLIPIDEMIA, UNSPECIFIED HYPERLIPIDEMIA TYPE: ICD-10-CM

## 2023-11-14 DIAGNOSIS — K62.5 HEMORRHAGE OF RECTUM AND ANUS: ICD-10-CM

## 2023-11-14 DIAGNOSIS — G47.00 INSOMNIA WITH SLEEP APNEA: ICD-10-CM

## 2023-11-14 DIAGNOSIS — G47.30 INSOMNIA WITH SLEEP APNEA: ICD-10-CM

## 2023-11-14 DIAGNOSIS — K63.5 HYPERPLASTIC POLYP OF INTESTINE: ICD-10-CM

## 2023-11-14 DIAGNOSIS — E11.9 DIABETES MELLITUS WITHOUT COMPLICATION: ICD-10-CM

## 2023-11-14 LAB
ALBUMIN SERPL-MCNC: 3.7 G/DL (ref 3.4–4.8)
ALBUMIN/GLOB SERPL: 1 RATIO (ref 1.1–2)
ALP SERPL-CCNC: 61 UNIT/L (ref 40–150)
ALT SERPL-CCNC: 23 UNIT/L (ref 0–55)
APPEARANCE UR: CLEAR
AST SERPL-CCNC: 18 UNIT/L (ref 5–34)
BACTERIA #/AREA URNS AUTO: NORMAL /HPF
BILIRUB SERPL-MCNC: 0.8 MG/DL
BILIRUB UR QL STRIP.AUTO: NEGATIVE
BUN SERPL-MCNC: 11.9 MG/DL (ref 8.4–25.7)
CALCIUM SERPL-MCNC: 8.8 MG/DL (ref 8.8–10)
CHLORIDE SERPL-SCNC: 105 MMOL/L (ref 98–107)
CHOLEST SERPL-MCNC: 134 MG/DL
CHOLEST/HDLC SERPL: 4 {RATIO} (ref 0–5)
CO2 SERPL-SCNC: 27 MMOL/L (ref 23–31)
COLOR UR AUTO: YELLOW
CREAT SERPL-MCNC: 0.75 MG/DL (ref 0.73–1.18)
ERYTHROCYTE [DISTWIDTH] IN BLOOD BY AUTOMATED COUNT: 12.9 % (ref 11.5–17)
EST. AVERAGE GLUCOSE BLD GHB EST-MCNC: 145.6 MG/DL
GFR SERPLBLD CREATININE-BSD FMLA CKD-EPI: >60 MLS/MIN/1.73/M2
GLOBULIN SER-MCNC: 3.6 GM/DL (ref 2.4–3.5)
GLUCOSE SERPL-MCNC: 150 MG/DL (ref 82–115)
GLUCOSE UR QL STRIP.AUTO: >=1000
HBA1C MFR BLD: 6.7 %
HCT VFR BLD AUTO: 52.6 % (ref 42–52)
HDLC SERPL-MCNC: 37 MG/DL (ref 35–60)
HGB BLD-MCNC: 16.6 G/DL (ref 14–18)
KETONES UR QL STRIP.AUTO: NEGATIVE
LDLC SERPL CALC-MCNC: 77 MG/DL (ref 50–140)
LEUKOCYTE ESTERASE UR QL STRIP.AUTO: NEGATIVE
MCH RBC QN AUTO: 29.2 PG (ref 27–31)
MCHC RBC AUTO-ENTMCNC: 31.6 G/DL (ref 33–36)
MCV RBC AUTO: 92.4 FL (ref 80–94)
NITRITE UR QL STRIP.AUTO: NEGATIVE
PH UR STRIP.AUTO: 5.5 [PH]
PLATELET # BLD AUTO: 428 X10(3)/MCL (ref 130–400)
PMV BLD AUTO: 10.2 FL (ref 7.4–10.4)
POTASSIUM SERPL-SCNC: 3.7 MMOL/L (ref 3.5–5.1)
PROT SERPL-MCNC: 7.3 GM/DL (ref 5.8–7.6)
PROT UR QL STRIP.AUTO: ABNORMAL
PSA SERPL-MCNC: 0.65 NG/ML
RBC # BLD AUTO: 5.69 X10(6)/MCL (ref 4.7–6.1)
RBC #/AREA URNS AUTO: NORMAL /HPF
RBC UR QL AUTO: NEGATIVE
SODIUM SERPL-SCNC: 141 MMOL/L (ref 136–145)
SP GR UR STRIP.AUTO: 1.02 (ref 1–1.03)
SQUAMOUS #/AREA URNS AUTO: NORMAL /HPF
TRIGL SERPL-MCNC: 98 MG/DL (ref 34–140)
TSH SERPL-ACNC: 0.98 UIU/ML (ref 0.35–4.94)
UROBILINOGEN UR STRIP-ACNC: 0.2
VLDLC SERPL CALC-MCNC: 20 MG/DL
WBC # SPEC AUTO: 11.69 X10(3)/MCL (ref 4.5–11.5)
WBC #/AREA URNS AUTO: NORMAL /HPF

## 2023-11-14 PROCEDURE — 84153 ASSAY OF PSA TOTAL: CPT

## 2023-11-14 PROCEDURE — 85027 COMPLETE CBC AUTOMATED: CPT

## 2023-11-14 PROCEDURE — 36415 COLL VENOUS BLD VENIPUNCTURE: CPT

## 2023-11-14 PROCEDURE — 80061 LIPID PANEL: CPT

## 2023-11-14 PROCEDURE — 80053 COMPREHEN METABOLIC PANEL: CPT

## 2023-11-14 PROCEDURE — 84443 ASSAY THYROID STIM HORMONE: CPT

## 2023-11-14 PROCEDURE — 81001 URINALYSIS AUTO W/SCOPE: CPT

## 2023-11-14 PROCEDURE — 83036 HEMOGLOBIN GLYCOSYLATED A1C: CPT

## 2023-12-20 ENCOUNTER — HOSPITAL ENCOUNTER (OUTPATIENT)
Dept: RADIOLOGY | Facility: HOSPITAL | Age: 63
Discharge: HOME | End: 2023-12-20
Attending: FAMILY MEDICINE
Payer: MEDICAID

## 2023-12-20 DIAGNOSIS — M25.571 RIGHT ANKLE PAIN: ICD-10-CM

## 2023-12-20 DIAGNOSIS — M25.571 RIGHT ANKLE PAIN: Primary | ICD-10-CM

## 2023-12-20 DIAGNOSIS — M79.673 FOOT PAIN: ICD-10-CM

## 2023-12-20 PROCEDURE — 73610 X-RAY EXAM OF ANKLE: CPT | Mod: TC,RT

## 2023-12-20 PROCEDURE — 73630 X-RAY EXAM OF FOOT: CPT | Mod: TC,RT

## 2023-12-21 DIAGNOSIS — M25.571 ACUTE RIGHT ANKLE PAIN: Primary | ICD-10-CM

## 2023-12-27 ENCOUNTER — OFFICE VISIT (OUTPATIENT)
Dept: ORTHOPEDICS | Facility: CLINIC | Age: 63
End: 2023-12-27
Payer: MEDICAID

## 2023-12-27 VITALS
RESPIRATION RATE: 20 BRPM | DIASTOLIC BLOOD PRESSURE: 81 MMHG | BODY MASS INDEX: 27.85 KG/M2 | OXYGEN SATURATION: 96 % | SYSTOLIC BLOOD PRESSURE: 148 MMHG | TEMPERATURE: 98 F | HEART RATE: 87 BPM | WEIGHT: 188 LBS | HEIGHT: 69 IN

## 2023-12-27 DIAGNOSIS — M25.571 ACUTE RIGHT ANKLE PAIN: Primary | ICD-10-CM

## 2023-12-27 PROCEDURE — 4010F ACE/ARB THERAPY RXD/TAKEN: CPT | Mod: CPTII,,, | Performed by: ORTHOPAEDIC SURGERY

## 2023-12-27 PROCEDURE — 99213 OFFICE O/P EST LOW 20 MIN: CPT | Mod: PBBFAC

## 2023-12-27 PROCEDURE — 3008F BODY MASS INDEX DOCD: CPT | Mod: CPTII,,, | Performed by: ORTHOPAEDIC SURGERY

## 2023-12-27 PROCEDURE — 1159F MED LIST DOCD IN RCRD: CPT | Mod: CPTII,,, | Performed by: ORTHOPAEDIC SURGERY

## 2023-12-27 PROCEDURE — 1159F PR MEDICATION LIST DOCUMENTED IN MEDICAL RECORD: ICD-10-PCS | Mod: CPTII,,, | Performed by: ORTHOPAEDIC SURGERY

## 2023-12-27 PROCEDURE — 3077F SYST BP >= 140 MM HG: CPT | Mod: CPTII,,, | Performed by: ORTHOPAEDIC SURGERY

## 2023-12-27 PROCEDURE — 99499 NO LOS: ICD-10-PCS | Mod: S$PBB,,, | Performed by: ORTHOPAEDIC SURGERY

## 2023-12-27 PROCEDURE — 3077F PR MOST RECENT SYSTOLIC BLOOD PRESSURE >= 140 MM HG: ICD-10-PCS | Mod: CPTII,,, | Performed by: ORTHOPAEDIC SURGERY

## 2023-12-27 PROCEDURE — 3079F PR MOST RECENT DIASTOLIC BLOOD PRESSURE 80-89 MM HG: ICD-10-PCS | Mod: CPTII,,, | Performed by: ORTHOPAEDIC SURGERY

## 2023-12-27 PROCEDURE — 3044F HG A1C LEVEL LT 7.0%: CPT | Mod: CPTII,,, | Performed by: ORTHOPAEDIC SURGERY

## 2023-12-27 PROCEDURE — 3008F PR BODY MASS INDEX (BMI) DOCUMENTED: ICD-10-PCS | Mod: CPTII,,, | Performed by: ORTHOPAEDIC SURGERY

## 2023-12-27 PROCEDURE — 4010F PR ACE/ARB THEARPY RXD/TAKEN: ICD-10-PCS | Mod: CPTII,,, | Performed by: ORTHOPAEDIC SURGERY

## 2023-12-27 PROCEDURE — 99499 UNLISTED E&M SERVICE: CPT | Mod: S$PBB,,, | Performed by: ORTHOPAEDIC SURGERY

## 2023-12-27 PROCEDURE — 3044F PR MOST RECENT HEMOGLOBIN A1C LEVEL <7.0%: ICD-10-PCS | Mod: CPTII,,, | Performed by: ORTHOPAEDIC SURGERY

## 2023-12-27 PROCEDURE — 3079F DIAST BP 80-89 MM HG: CPT | Mod: CPTII,,, | Performed by: ORTHOPAEDIC SURGERY

## 2023-12-27 RX ORDER — CHOLECALCIFEROL (VITAMIN D3) 50 MCG
TABLET ORAL
COMMUNITY

## 2023-12-27 RX ORDER — GABAPENTIN 300 MG/1
1 CAPSULE ORAL
COMMUNITY

## 2023-12-27 RX ORDER — LISINOPRIL 5 MG/1
TABLET ORAL
COMMUNITY

## 2023-12-27 RX ORDER — ROSUVASTATIN CALCIUM 40 MG/1
TABLET, COATED ORAL
COMMUNITY

## 2023-12-27 RX ORDER — EMPAGLIFLOZIN 10 MG/1
TABLET, FILM COATED ORAL
COMMUNITY

## 2023-12-27 RX ORDER — GLIPIZIDE 10 MG/1
10 TABLET ORAL 2 TIMES DAILY
COMMUNITY

## 2023-12-27 RX ORDER — SITAGLIPTIN 100 MG/1
TABLET, FILM COATED ORAL
COMMUNITY
Start: 2023-12-11

## 2023-12-27 RX ORDER — OMEPRAZOLE 20 MG/1
20 CAPSULE, DELAYED RELEASE ORAL
COMMUNITY

## 2023-12-27 RX ORDER — NIFEDIPINE 90 MG/1
TABLET, EXTENDED RELEASE ORAL
COMMUNITY
Start: 2023-11-21

## 2023-12-27 NOTE — PROGRESS NOTES
[facility]  Established Patient Office Visit  12/27/2023       Patient ID: Alireza Blum  YOB: 1960  MRN: [id number]    Chief Complaint: Injury and Pain of the Right Ankle (Rolled ankle while pulling a tree in the yard about 3 weeks ago pain 7/10 not taking anything for pain at this time)      Past Orthopaedic Surgeries:  None    HPI:  Alireza Blum is a 63 y.o. male with right ankle injury after rolling his ankle 3 weeks ago.  Did not initially present.  He has been weight-bearing as tolerated in a wrap.  Denies any open wounds denies any dislocation requiring reduction denies any paresthesias in the right lower extremity.  No gross deformities.  Pain has been gradually improving.    Past Medical History:    Past Medical History:   Diagnosis Date    Essential (primary) hypertension     Paroxysmal atrial fibrillation      Past Surgical History:   Procedure Laterality Date    SHOULDER SURGERY       Family History   Problem Relation Age of Onset    Diabetes Mother     Diabetes Father     Diabetes Sister      Social History     Socioeconomic History    Marital status:    Tobacco Use    Smoking status: Some Days     Types: Cigarettes    Smokeless tobacco: Never     Medication List with Changes/Refills   Current Medications    CHOLECALCIFEROL, VITAMIN D3, (VITAMIN D3) 50 MCG (2,000 UNIT) TAB    1 tablet Orally Once a day for 30 day(s)    GABAPENTIN (NEURONTIN) 300 MG CAPSULE    1 capsule.    GLIPIZIDE (GLUCOTROL) 10 MG TABLET    Take 10 mg by mouth 2 (two) times daily.    JANUVIA 100 MG TAB        JARDIANCE 10 MG TABLET    1 tablet Orally Once a day    LISINOPRIL (PRINIVIL,ZESTRIL) 5 MG TABLET    1 tablet Orally Once a day for 30 day(s)    NIFEDIPINE (PROCARDIA-XL) 90 MG (OSM) 24 HR TABLET        OMEPRAZOLE (PRILOSEC) 20 MG CAPSULE    Take 20 mg by mouth.    ROSUVASTATIN (CRESTOR) 40 MG TAB    1 tablet Orally Once a day for 30 day(s)     Review of patient's allergies  indicates:  No Known Allergies    Physical Exam:    Right ankle   Tenderness to palpation along the lateral malleolus no tenderness to palpation anywhere throughout the ankle   Full range of motion of the ankle   No paresthesias, full sensation   Motor intact EHL FHL TA GS   No gross deformity no bruising no swelling   Foot warm and well-perfused    Imaging:  X-rays show nondisplaced Oreilly B if fibula fracture with maintained ankle mortise no signs of widening    Assessment and Plan:    Alireza Blum is a 63 y.o. male with nondisplaced Oreilly B fibula fracture sustained 3 weeks ago.  Patient has been weight-bearing on it and there are no signs of widening.  Weightbearing as tolerated in boot   Follow up in 2 weeks for repeat x-rays and clearance    Shantanu Hawley MD  [employer] Orthopaedic Surgery PGY-3

## 2023-12-27 NOTE — PROGRESS NOTES
Faculty Attestation: Alireza Blum  was seen at [facility] in the Orthopaedic Clinic. Discussed with the resident at the time of the visit. History of Present Illness, Physical Exam, and Assessment and Plan reviewed. Treatment plan is reasonable and appropriate. Compliance with treatment recommendations is important. No procedure was performed.     Hayden Chaudhari MD  Orthopaedic Surgery

## 2024-01-10 ENCOUNTER — HOSPITAL ENCOUNTER (OUTPATIENT)
Dept: RADIOLOGY | Facility: HOSPITAL | Age: 64
Discharge: HOME | End: 2024-01-10
Attending: STUDENT IN AN ORGANIZED HEALTH CARE EDUCATION/TRAINING PROGRAM
Payer: MEDICAID

## 2024-01-10 ENCOUNTER — OFFICE VISIT (OUTPATIENT)
Dept: ORTHOPEDICS | Facility: CLINIC | Age: 64
End: 2024-01-10
Payer: MEDICAID

## 2024-01-10 VITALS
HEART RATE: 69 BPM | HEIGHT: 69 IN | SYSTOLIC BLOOD PRESSURE: 120 MMHG | BODY MASS INDEX: 26.66 KG/M2 | TEMPERATURE: 98 F | DIASTOLIC BLOOD PRESSURE: 76 MMHG | WEIGHT: 180 LBS

## 2024-01-10 DIAGNOSIS — S82.831D OTHER CLOSED FRACTURE OF DISTAL END OF RIGHT FIBULA WITH ROUTINE HEALING, SUBSEQUENT ENCOUNTER: Primary | ICD-10-CM

## 2024-01-10 DIAGNOSIS — S93.401A SPRAIN OF RIGHT ANKLE, UNSPECIFIED LIGAMENT, INITIAL ENCOUNTER: ICD-10-CM

## 2024-01-10 PROCEDURE — 73610 X-RAY EXAM OF ANKLE: CPT | Mod: TC,RT

## 2024-01-10 PROCEDURE — 99213 OFFICE O/P EST LOW 20 MIN: CPT | Mod: PBBFAC

## 2024-01-10 PROCEDURE — 1159F MED LIST DOCD IN RCRD: CPT | Mod: CPTII,,, | Performed by: SPECIALIST

## 2024-01-10 PROCEDURE — 3074F SYST BP LT 130 MM HG: CPT | Mod: CPTII,,, | Performed by: SPECIALIST

## 2024-01-10 PROCEDURE — 99213 OFFICE O/P EST LOW 20 MIN: CPT | Mod: S$PBB,,, | Performed by: SPECIALIST

## 2024-01-10 PROCEDURE — 3008F BODY MASS INDEX DOCD: CPT | Mod: CPTII,,, | Performed by: SPECIALIST

## 2024-01-10 PROCEDURE — 3078F DIAST BP <80 MM HG: CPT | Mod: CPTII,,, | Performed by: SPECIALIST

## 2024-01-10 NOTE — PROGRESS NOTES
Past Medical History:   Diagnosis Date    Diabetes mellitus, type 2     Essential (primary) hypertension     GERD (gastroesophageal reflux disease)     Paroxysmal atrial fibrillation     Sleep apnea        Past Surgical History:   Procedure Laterality Date    SHOULDER SURGERY         Current Outpatient Medications   Medication Sig    cholecalciferol, vitamin D3, (VITAMIN D3) 50 mcg (2,000 unit) Tab 1 tablet Orally Once a day for 30 day(s)    gabapentin (NEURONTIN) 300 MG capsule 1 capsule.    glipiZIDE (GLUCOTROL) 10 MG tablet Take 10 mg by mouth 2 (two) times daily.    JANUVIA 100 mg Tab     JARDIANCE 10 mg tablet 1 tablet Orally Once a day    lisinopriL (PRINIVIL,ZESTRIL) 5 MG tablet 1 tablet Orally Once a day for 30 day(s)    NIFEdipine (PROCARDIA-XL) 90 MG (OSM) 24 hr tablet     omeprazole (PRILOSEC) 20 MG capsule Take 20 mg by mouth.    rosuvastatin (CRESTOR) 40 MG Tab 1 tablet Orally Once a day for 30 day(s)     No current facility-administered medications for this visit.       Review of patient's allergies indicates:  No Known Allergies    Family History   Problem Relation Age of Onset    Diabetes Mother     Diabetes Father     Diabetes Sister        Social History     Socioeconomic History    Marital status:    Tobacco Use    Smoking status: Some Days     Types: Cigarettes    Smokeless tobacco: Never   Substance and Sexual Activity    Alcohol use: Not Currently    Drug use: Never    Sexual activity: Yes       Chief Complaint:   Chief Complaint   Patient presents with    Right Ankle - Pain, Injury     Right ankle follow up doing good Taking OTC as needed which helps.  He is having some pain in the achilles area.  Wearing the walking boot andusing a cane to ambulate        Consulting Physician: No ref. provider found    History of present illness:    This is a 63 y.o. year old male who complains of mild tenderness in the ankle boot when he plantar flex his foot and ankle at the insertion of the  "Achilles.  He has minimal tenderness now over the distal fibula.  He is about 1 month out from nondisplaced Oreilly B fracture of the right distal fibula.  He is ambulating full weight-bearing with a cane and a walker boot.    Review of Systems:    Constitution:   Denies chills, fever, and sweats.  HENT:   Denies headaches or blurry vision.  Cardiovascular:  Denies chest pain or irregular heart beat.  Respiratory:   Denies cough or shortness of breath.  Gastrointestinal:  Denies abdominal pain, nausea, or vomiting.  Musculoskeletal:   Denies muscle cramps.  Neurological:   Denies dizziness or focal weakness.  Psychiatric/Behavior: Normal mental status.  Hematology/Lymph:  Denies bleeding problem or easy bruising/bleeding.  Skin:    Denies rash or suspicious lesions.    Examination:    Vital Signs:    Vitals:    01/10/24 1244   BP: 120/76   Pulse: 69   Temp: 97.9 °F (36.6 °C)   Weight: 81.6 kg (180 lb)   Height: 5' 9" (1.753 m)   PainSc:   6       Body mass index is 26.58 kg/m².    Constitution:   Well-developed, well nourished patient in no acute distress.  Neurological:   Alert and oriented x 3 and cooperative to examination.     Psychiatric/Behavior: Normal mental status.  Respiratory:   No shortness of breath.non labored breathing.  Cardiovascular: Regular rate and rhythm  Eyes:    Extraoccular muscles intact  Skin:    No scars, rash or suspicious lesions.    Physical Exam:  Right foot and ankle exam:   Mild swelling over the distal fibula   No tenderness to palpation of the Achilles or the insertion of the Achilles.  No Achilles defect.    No tenderness to palpation over the distal fibula   No medial tenderness   No tenderness with plantar flexion or dorsiflexion of the hindfoot, midfoot, forefoot, or ankle, or Achilles   2+ pulses with intact sensory and motor function   Normal knee range motion   Ambulates well full weight-bearing in a walker boot with a cane    Imaging: X-rays ordered and images interpreted " today personally by me of three views of the right ankle which show a healing right distal fibula fracture with no displacement.  Impression:  As above.         Assessment: Other closed fracture of distal end of right fibula with routine healing, subsequent encounter    Sprain of right ankle, unspecified ligament, initial encounter  -     X-Ray Ankle Complete Right; Future; Expected date: 01/10/2024        Plan:  Start transitioning to regular shoe wear full weight-bearing with a cane   Follow up for repeat radiographs with three views of the right ankle in 1 month and likely discharge from clinic at that time.      DISCLAIMER: This note may have been dictated using voice recognition software and may contain grammatical errors.     NOTE: Consult report sent to referring provider via [employer] EMR.

## 2024-04-03 ENCOUNTER — LAB VISIT (OUTPATIENT)
Dept: LAB | Facility: HOSPITAL | Age: 64
End: 2024-04-03
Attending: INTERNAL MEDICINE
Payer: MEDICAID

## 2024-04-03 DIAGNOSIS — I10 ESSENTIAL HYPERTENSION, MALIGNANT: Primary | ICD-10-CM

## 2024-04-03 DIAGNOSIS — E78.2 MIXED HYPERLIPIDEMIA: ICD-10-CM

## 2024-04-03 LAB
ALBUMIN SERPL-MCNC: 3.7 G/DL (ref 3.4–4.8)
ALP SERPL-CCNC: 68 UNIT/L (ref 40–150)
ALT SERPL-CCNC: 13 UNIT/L (ref 0–55)
AST SERPL-CCNC: 11 UNIT/L (ref 5–34)
BILIRUB SERPL-MCNC: 0.7 MG/DL
BILIRUBIN DIRECT+TOT PNL SERPL-MCNC: 0.3 MG/DL (ref 0–?)
BILIRUBIN DIRECT+TOT PNL SERPL-MCNC: 0.4 MG/DL (ref 0–0.8)
CHOLEST SERPL-MCNC: 150 MG/DL
CHOLEST/HDLC SERPL: 4 {RATIO} (ref 0–5)
HDLC SERPL-MCNC: 40 MG/DL (ref 35–60)
LDLC SERPL CALC-MCNC: 90 MG/DL (ref 50–140)
PROT SERPL-MCNC: 7 GM/DL (ref 5.8–7.6)
TRIGL SERPL-MCNC: 102 MG/DL (ref 34–140)
VLDLC SERPL CALC-MCNC: 20 MG/DL

## 2024-04-03 PROCEDURE — 36415 COLL VENOUS BLD VENIPUNCTURE: CPT

## 2024-04-03 PROCEDURE — 80076 HEPATIC FUNCTION PANEL: CPT

## 2024-04-03 PROCEDURE — 80061 LIPID PANEL: CPT

## 2024-07-23 DIAGNOSIS — Z87.891 PERSONAL HISTORY OF TOBACCO USE, PRESENTING HAZARDS TO HEALTH: Primary | ICD-10-CM

## 2024-08-02 ENCOUNTER — HOSPITAL ENCOUNTER (OUTPATIENT)
Dept: RADIOLOGY | Facility: HOSPITAL | Age: 64
Discharge: HOME | End: 2024-08-02
Attending: INTERNAL MEDICINE
Payer: MEDICAID

## 2024-08-02 DIAGNOSIS — Z87.891 PERSONAL HISTORY OF TOBACCO USE, PRESENTING HAZARDS TO HEALTH: ICD-10-CM

## 2024-08-02 PROCEDURE — 71271 CT THORAX LUNG CANCER SCR C-: CPT | Mod: TC

## 2024-08-19 ENCOUNTER — LAB VISIT (OUTPATIENT)
Dept: LAB | Facility: HOSPITAL | Age: 64
End: 2024-08-19
Attending: NURSE PRACTITIONER
Payer: MEDICAID

## 2024-08-19 DIAGNOSIS — G47.30 INSOMNIA WITH SLEEP APNEA: ICD-10-CM

## 2024-08-19 DIAGNOSIS — M25.571 RIGHT ANKLE PAIN: ICD-10-CM

## 2024-08-19 DIAGNOSIS — M20.60 ACQUIRED DEFORMITY OF TOE, UNSPECIFIED LATERALITY: ICD-10-CM

## 2024-08-19 DIAGNOSIS — M79.645 PAIN IN FINGER OF LEFT HAND: ICD-10-CM

## 2024-08-19 DIAGNOSIS — I48.91 ATRIAL FIBRILLATION: Primary | ICD-10-CM

## 2024-08-19 DIAGNOSIS — K62.5 HEMORRHAGE OF RECTUM AND ANUS: ICD-10-CM

## 2024-08-19 DIAGNOSIS — E11.9 DIABETES MELLITUS WITHOUT COMPLICATION: ICD-10-CM

## 2024-08-19 DIAGNOSIS — G47.00 INSOMNIA WITH SLEEP APNEA: ICD-10-CM

## 2024-08-19 DIAGNOSIS — I10 ESSENTIAL HYPERTENSION, MALIGNANT: ICD-10-CM

## 2024-08-19 DIAGNOSIS — E78.5 HYPERLIPIDEMIA, UNSPECIFIED HYPERLIPIDEMIA TYPE: ICD-10-CM

## 2024-08-19 DIAGNOSIS — K63.5 HYPERPLASTIC POLYP OF INTESTINE: ICD-10-CM

## 2024-08-19 LAB
ALBUMIN SERPL-MCNC: 4.2 G/DL (ref 3.4–4.8)
ALBUMIN/GLOB SERPL: 1.3 RATIO (ref 1.1–2)
ALP SERPL-CCNC: 57 UNIT/L (ref 40–150)
ALT SERPL-CCNC: 21 UNIT/L (ref 0–55)
ANION GAP SERPL CALC-SCNC: 8 MEQ/L
AST SERPL-CCNC: 16 UNIT/L (ref 5–34)
BILIRUB SERPL-MCNC: 0.9 MG/DL
BUN SERPL-MCNC: 17.9 MG/DL (ref 8.4–25.7)
CALCIUM SERPL-MCNC: 9.7 MG/DL (ref 8.8–10)
CHLORIDE SERPL-SCNC: 106 MMOL/L (ref 98–107)
CO2 SERPL-SCNC: 29 MMOL/L (ref 23–31)
CREAT SERPL-MCNC: 0.78 MG/DL (ref 0.73–1.18)
CREAT/UREA NIT SERPL: 23
EST. AVERAGE GLUCOSE BLD GHB EST-MCNC: 139.9 MG/DL
GFR SERPLBLD CREATININE-BSD FMLA CKD-EPI: >60 ML/MIN/1.73/M2
GLOBULIN SER-MCNC: 3.2 GM/DL (ref 2.4–3.5)
GLUCOSE SERPL-MCNC: 168 MG/DL (ref 82–115)
HBA1C MFR BLD: 6.5 %
POTASSIUM SERPL-SCNC: 4.6 MMOL/L (ref 3.5–5.1)
PROT SERPL-MCNC: 7.4 GM/DL (ref 5.8–7.6)
SODIUM SERPL-SCNC: 143 MMOL/L (ref 136–145)

## 2024-08-19 PROCEDURE — 80053 COMPREHEN METABOLIC PANEL: CPT

## 2024-08-19 PROCEDURE — 83036 HEMOGLOBIN GLYCOSYLATED A1C: CPT

## 2024-08-19 PROCEDURE — 36415 COLL VENOUS BLD VENIPUNCTURE: CPT

## 2025-02-14 ENCOUNTER — HOSPITAL ENCOUNTER (OUTPATIENT)
Dept: RADIOLOGY | Facility: HOSPITAL | Age: 65
Discharge: HOME | End: 2025-02-14
Attending: FAMILY MEDICINE
Payer: MEDICAID

## 2025-02-14 DIAGNOSIS — M25.512 LEFT SHOULDER PAIN: Primary | ICD-10-CM

## 2025-02-14 DIAGNOSIS — M25.512 LEFT SHOULDER PAIN: ICD-10-CM

## 2025-02-14 PROCEDURE — 73030 X-RAY EXAM OF SHOULDER: CPT | Mod: TC,LT

## 2025-03-24 ENCOUNTER — HOSPITAL ENCOUNTER (OUTPATIENT)
Dept: RADIOLOGY | Facility: HOSPITAL | Age: 65
Discharge: HOME | End: 2025-03-24
Attending: FAMILY MEDICINE
Payer: MEDICAID

## 2025-03-24 DIAGNOSIS — R05.9 COUGH, UNSPECIFIED: Primary | ICD-10-CM

## 2025-03-24 DIAGNOSIS — R05.9 COUGH, UNSPECIFIED: ICD-10-CM

## 2025-03-24 PROCEDURE — 71046 X-RAY EXAM CHEST 2 VIEWS: CPT | Mod: TC

## 2025-03-31 DIAGNOSIS — M25.512 LEFT SHOULDER PAIN: Primary | ICD-10-CM

## 2025-05-19 ENCOUNTER — HOSPITAL ENCOUNTER (OUTPATIENT)
Dept: RADIOLOGY | Facility: HOSPITAL | Age: 65
Discharge: HOME | End: 2025-05-19
Attending: FAMILY MEDICINE
Payer: MEDICAID

## 2025-05-19 DIAGNOSIS — M54.2 NECK PAIN: Primary | ICD-10-CM

## 2025-05-19 DIAGNOSIS — M54.2 NECK PAIN: ICD-10-CM

## 2025-05-19 PROCEDURE — 72040 X-RAY EXAM NECK SPINE 2-3 VW: CPT | Mod: TC

## 2025-05-23 DIAGNOSIS — M25.519 PAIN IN JOINT, SHOULDER REGION: Primary | ICD-10-CM

## 2025-05-30 ENCOUNTER — CLINICAL SUPPORT (OUTPATIENT)
Dept: REHABILITATION | Facility: HOSPITAL | Age: 65
End: 2025-05-30
Payer: MEDICAID

## 2025-05-30 DIAGNOSIS — R29.898 WEAKNESS OF LEFT SHOULDER: ICD-10-CM

## 2025-05-30 DIAGNOSIS — M53.84 DECREASED ROM OF THORACIC SPINE: ICD-10-CM

## 2025-05-30 DIAGNOSIS — M25.519 PAIN IN JOINT, SHOULDER REGION: Primary | ICD-10-CM

## 2025-05-30 DIAGNOSIS — M53.82 DECREASED ROM OF INTERVERTEBRAL DISCS OF CERVICAL SPINE: ICD-10-CM

## 2025-05-30 DIAGNOSIS — M25.512 PAIN IN JOINT OF LEFT SHOULDER: Primary | ICD-10-CM

## 2025-05-30 DIAGNOSIS — M25.612 DECREASED ROM OF LEFT SHOULDER: ICD-10-CM

## 2025-05-30 PROCEDURE — 97162 PT EVAL MOD COMPLEX 30 MIN: CPT

## 2025-05-30 PROCEDURE — 97110 THERAPEUTIC EXERCISES: CPT

## 2025-05-30 NOTE — LETTER
May 30, 2025  Ketan Valdivia MD  [address]  [place] [address]    To whom it may concern,     The attached plan of care is being sent to you for review and reference.    You may indicate your approval by signing the document electronically, or by faxing/mailing a signed copy of the final page of this document back to the attention of Jake Vazquez, PT:         Plan of Care 5/30/25   Effective from: 5/30/2025  Effective to: 7/25/2025    Plan ID: [id number]            Participants as of Finalize on 5/30/2025    Name Type Comments Contact Info    Ketan Valdivia MD PCP - General  [phone]    Jake Vazquez PT Physical Therapist         Last Plan Note     Author: Jake Vazquez PT Status: Signed Last edited: 5/30/2025  9:30 AM         Outpatient Rehab    Physical Therapy Evaluation    Patient Name: Alireza Blum  MRN: [id number]  YOB: 1960  Encounter Date: 5/30/2025    Therapy Diagnosis:   Encounter Diagnoses   Name Primary?    Pain in joint of left shoulder Yes    Weakness of left shoulder     Decreased ROM of left shoulder     Decreased ROM of intervertebral discs of cervical spine     Decreased ROM of thoracic spine      Physician: Ketan Valdivia MD    Physician Orders: Eval and Treat  Medical Diagnosis: Pain in joint, shoulder region    Visit # / Visits Authorized:  1 / 1  Insurance Authorization Period: 5/23/2025 to 5/23/2026  Date of Evaluation: 5/30/2025  Plan of Care Certification: 5/30/2025 to TBD (asked for 2 wk 6)     Time In: 0930   Time Out: 1017  Total Time (in minutes): 47   Total Billable Time (in minutes): 40      Subjective   History of Present Illness  Alireza is a 65 y.o. male who reports to physical therapy with a chief concern of L shoulder pain and stiffness when attempting to lift his UE. He also complains of L shoulder weakness.     The patient reports a medical diagnosis of M25.519 (Pain in joint, shoulder L). The patient has  experienced this issue since 05/30/25.   Diagnostic tests related to this condition: X-ray.   X-Ray Details: L shoulder (02/14/25)=Superior positioning of the humeral head with severe narrowing of acrominal humberal interval and degenerative change of acromial humeral articulation; Cervical spine (05/19/25)= straightening of normal curvature of cervical spine and degenerative changes    Dominant Hand: Right  History of Present Condition/Illness: The pt is a 65 year old male who reports to therapy with a c/c of L shoulder pain, stiffness, and weakness. He reports he had a L shoulder RTC surgery about ten years ago in [place], and since then has been unable to move his LUE the way he wants to. He also reports 2 instances within the past month in which he felt like (B)UE were weak and that he could not hold anything. He denies any dizziness, drop attacks, or trouble swallowing. He lives in a  home with his wife. He reports his pain is worst first thing in the morning.     Activities of Daily Living  Social history was obtained from Patient.    General Prior Level of Function Comments: Mod I  General Current Level of Function Comments: Mod I  Patient Roles: Caregiver for adult  Patient Responsibilities: Driving, Financial management, Personal ADL, Yard work, Meal prep, Home management, Health management, Community mobility    Previously independent with activities of daily living? Yes     Currently independent with activities of daily living? Yes          Previously independent with instrumental activities of daily living? Yes     Currently independent with instrumental activities of daily living? Yes              Pain     Patient reports a current pain level of 3/10. Pain at best is reported as 1/10. Pain at worst is reported as 8/10.   Location: Anterior and Lateral L Shoulder  Clinical Progression (since onset): Unchanged  Pain Qualities: Aching, Discomfort, Tightness  Pain-Relieving Factors: Change in position,  Movement  Pain-Aggravating Factors: Sleeping, Lying down         Living Arrangements  Living Situation  Housing: Home independently  Living Arrangements: Spouse/significant other  Support Systems: Spouse/significant other        Employment  Patient does not report that: Does the patient's condition impact their ability to work?  Employment Status: Retired          Past Medical History/Physical Systems Review:   Alireza Blum  has a past medical history of Diabetes mellitus, type 2, Essential (primary) hypertension, GERD (gastroesophageal reflux disease), Paroxysmal atrial fibrillation, and Sleep apnea.    Alireza Blum  has a past surgical history that includes Shoulder surgery.    Alireza has a current medication list which includes the following prescription(s): cholecalciferol (vitamin d3), gabapentin, glipizide, januvia, jardiance, lisinopril, nifedipine, omeprazole, and rosuvastatin.    Review of patient's allergies indicates:  No Known Allergies     Objective   Posture                 The pt presents with mod fwd head posture and rounded shoulders. He is UT dominant with any attempted LUE overhead movement.    Shoulder Palpation  Right Shoulder Palpation  Unremarkable: Muscle, Bony Prominence/Bursa, and Tendon/Ligament          Left Shoulder Palpation  Abnormal: Muscle  Unremarkable: Bony Prominence/Bursa and Tendon/Ligament  Left Shoulder Muscle Palpation Observations: TTP of L posterior cuff, posterior deltoid, and L UT           Subcranial Range of Motion   Active Restricted? Passive Restricted? Pain   Flexion         Protraction         Retraction           Cervical SBR and Rot L AROM= 50% of WFL; Cervical Flexion= WFL; Cervical Extension= 25% of WFL; Cervical SBL and Rot R= 75% of WFL; Thoracic Rotation: R=25% of WFL with p! And L=50% of WFL    Shoulder Range of Motion  Right Shoulder   Active (deg) Passive (deg) Pain   Flexion 175 180     Extension         Scaption         ABduction 180 183      ADduction         Horizontal ABduction         Horizontal ADduction         External Rotation (Shoulder ABducted 0 degrees)         External Rotation (Shoulder ABducted 45 degrees)         External Rotation (Shoulder ABducted 90 degrees)         Internal Rotation (Shoulder ABducted 0 degrees)         Internal Rotation (Shoulder ABducted 45 degrees)         Internal Rotation (Shoulder ABducted 90 degrees)           Left Shoulder   Active (deg) Passive (deg) Pain   Flexion 105 165 Yes   Extension         Scaption         ABduction 117 180 Yes   ADduction         Horizontal ABduction         Horizontal ADduction         External Rotation (Shoulder ABducted 0 degrees)   80     External Rotation (Shoulder ABducted 45 degrees)         External Rotation (Shoulder ABducted 90 degrees)         Internal Rotation (Shoulder ABducted 0 degrees)   60 Yes   Internal Rotation (Shoulder ABducted 45 degrees)         Internal Rotation (Shoulder ABducted 90 degrees)                       Shoulder Strength - Planes of Motion   Right Strength Right Pain Left Strength Left  Pain   Flexion 4   3+ Yes   Extension           ABduction 4   3+ Yes   ADduction           Horizontal ABduction           Horizontal ADduction           Internal Rotation 0° 4+   4     Internal Rotation 90°           External Rotation 0° 4   3+     External Rotation 90°               Shoulder Strength - Scapular Stabilizing Muscles   Right Strength Right Pain Left Strength Left  Pain   Lower Trapezius 3   2+ Yes   Middle Trapezius 3+   3 Yes   Rhomboids 3+   3 Yes     Elbow Strength   Right Strength Right Pain Left Strength Left  Pain   Flexion (C6) 4+   4     Extension (C7) 4+   4            Right  Strength  Right Hand Dynamometer Position: 2  Elbow Position Forearm Position Trial 1 (lbs) Trial 2  (lbs) Trial 3  (lbs) Average  (lbs) Pain   Flexed Neutral 70 73 65 69.33         Left  Strength  Left Hand Dynamometer Position: 2  Elbow Position Forearm Position  "Trial 1 (lbs) Trial 2 (lbs) Trial 3 (lbs) Average (lbs) Pain   Flexed Neutral 41 38 37 38.67                Shoulder Joint Mobility            Medial NTT + on L for pain           Treatment:  Therapeutic Exercise  TE 1: retractions and shoulder extensions for green TB resistance  TE 2: thoracic rotation and chair extension for thoracic mobility      Time Entry(in minutes):  PT Evaluation (Moderate) Time Entry: 30  Therapeutic Exercise Time Entry: 10    Assessment & Plan   Assessment  Alireza presents with a condition of Moderate complexity.   Presentation of Symptoms: Stable  Will Comorbidities Impact Care: Yes  Previous L RTC sx about 10 years ago    Functional Limitations: Activity tolerance, Carrying objects, Pain when reaching, Pain with ADLs/IADLs, Range of motion  Impairments: Lack of appropriate home exercise program, Impaired physical strength, Pain with functional activity, Abnormal or restricted range of motion, Activity intolerance, Abnormal muscle firing  Personal Factors Affecting Prognosis: Pain    Patient Goal for Therapy (PT): "I want to be able to raise my L arm with no pain."  Prognosis: Good  Prognosis Details: The pt is motivated to improve his condition and has a good support system at home.  Assessment Details: The pt presents with cervical/thoracic/shoulder ROM deficits, LUE strength deficits, decreased stabilization of (B) shoulders, muscular tightness, and myofascial pain that can be addressed by skilled PT interventions.    Plan  From a physical therapy perspective, the patient would benefit from: Skilled Rehab Services    Planned therapy interventions include: Therapeutic exercise, Therapeutic activities, Neuromuscular re-education, Manual therapy, and ADLs/IADLs.            Visit Frequency: 2 times Per Week for 6 Weeks.       This plan was discussed with Patient.   Discussion participants: Agreed Upon Plan of Care  Plan details: Pt was educated on importance of compliance with PT sessions " "as well as HEP. He would benefit from skilled PT interventions at 2x a week for 6 weeks.           The patient's spiritual, cultural, and educational needs were considered, and the patient is agreeable to the plan of care and goals.     Education  Education was done with Patient. The patient's learning style includes Demonstration, Listening, and Pictures/video. The patient Demonstrates understanding and Verbalizes understanding.         Printed HEP of seated chair extensions, seated thoracic rotations, retractions (G), and shoulder ext (G)       Goals:   Active       Functional outcome       Patient will show a significant change in FOTO patient-reported outcome tool to demonstrate subjective improvement (Progressing)       Start:  05/30/25    Expected End:  07/11/25            Patient stated goal: "I want to be able to raise my L arm with no pain."  (Progressing)       Start:  05/30/25    Expected End:  07/11/25            Patient will demonstrate independence in home program for support of progression (Progressing)       Start:  05/30/25    Expected End:  07/11/25               Pain       Patient will report L shoulder pain at worst of 1/10 demonstrating a reduction of overall pain (Progressing)       Start:  05/30/25    Expected End:  07/11/25               Range of Motion       Patient will achieve left shoulder flexion of 160 degrees (Progressing)       Start:  05/30/25    Expected End:  07/11/25            Patient will achieve left shoulder abduction of 170 degrees (Progressing)       Start:  05/30/25    Expected End:  07/11/25               Strength       Patient will achieve left shoulder flexion strength of 4+/5 (Progressing)       Start:  05/30/25    Expected End:  07/11/25            Patient will achieve left shoulder abduction strength of 4+/5 (Progressing)       Start:  05/30/25    Expected End:  07/11/25                Jake Vazquez, PT           Current Participants as of 5/30/2025    Name Type " Comments Contact Info    Ketan Valdivia MD PCP - General  [phone]    Signature pending    Jake Vazquez, PT Physical Therapist                  Sincerely,      Jake Vazquez, PT  [facility]                                                            Dear Jake Vazquez, PT,    RE: Mr. Alireza Blum, MRN: [id number]    I certify that I have reviewed the attached plan of care and agree to the details within.        ___________________________  ___________________________  Provider Printed Name   Provider Signed Name      ___________________________  Date and Time

## 2025-05-30 NOTE — PROGRESS NOTES
Outpatient Rehab    Physical Therapy Evaluation    Patient Name: Alireza Blum  MRN: [id number]  YOB: 1960  Encounter Date: 5/30/2025    Therapy Diagnosis:   Encounter Diagnoses   Name Primary?    Pain in joint of left shoulder Yes    Weakness of left shoulder     Decreased ROM of left shoulder     Decreased ROM of intervertebral discs of cervical spine     Decreased ROM of thoracic spine      Physician: Ketan Valdivia MD    Physician Orders: Eval and Treat  Medical Diagnosis: Pain in joint, shoulder region    Visit # / Visits Authorized:  1 / 1  Insurance Authorization Period: 5/23/2025 to 5/23/2026  Date of Evaluation: 5/30/2025  Plan of Care Certification: 5/30/2025 to TBD (asked for 2 wk 6)     Time In: 0930   Time Out: 1017  Total Time (in minutes): 47   Total Billable Time (in minutes): 40      Subjective   History of Present Illness  Alireza is a 65 y.o. male who reports to physical therapy with a chief concern of L shoulder pain and stiffness when attempting to lift his UE. He also complains of L shoulder weakness.     The patient reports a medical diagnosis of M25.519 (Pain in joint, shoulder L). The patient has experienced this issue since 05/30/25.   Diagnostic tests related to this condition: X-ray.   X-Ray Details: L shoulder (02/14/25)=Superior positioning of the humeral head with severe narrowing of acrominal humberal interval and degenerative change of acromial humeral articulation; Cervical spine (05/19/25)= straightening of normal curvature of cervical spine and degenerative changes    Dominant Hand: Right  History of Present Condition/Illness: The pt is a 65 year old male who reports to therapy with a c/c of L shoulder pain, stiffness, and weakness. He reports he had a L shoulder RTC surgery about ten years ago in [place], and since then has been unable to move his LUE the way he wants to. He also reports 2 instances within the past month in which he felt like (B)UE were weak  and that he could not hold anything. He denies any dizziness, drop attacks, or trouble swallowing. He lives in a SS home with his wife. He reports his pain is worst first thing in the morning.     Activities of Daily Living  Social history was obtained from Patient.    General Prior Level of Function Comments: Mod I  General Current Level of Function Comments: Mod I  Patient Roles: Caregiver for adult  Patient Responsibilities: Driving, Financial management, Personal ADL, Yard work, Meal prep, Home management, Health management, Community mobility    Previously independent with activities of daily living? Yes     Currently independent with activities of daily living? Yes          Previously independent with instrumental activities of daily living? Yes     Currently independent with instrumental activities of daily living? Yes              Pain     Patient reports a current pain level of 3/10. Pain at best is reported as 1/10. Pain at worst is reported as 8/10.   Location: Anterior and Lateral L Shoulder  Clinical Progression (since onset): Unchanged  Pain Qualities: Aching, Discomfort, Tightness  Pain-Relieving Factors: Change in position, Movement  Pain-Aggravating Factors: Sleeping, Lying down         Living Arrangements  Living Situation  Housing: Home independently  Living Arrangements: Spouse/significant other  Support Systems: Spouse/significant other        Employment  Patient does not report that: Does the patient's condition impact their ability to work?  Employment Status: Retired          Past Medical History/Physical Systems Review:   Alireza Blum  has a past medical history of Diabetes mellitus, type 2, Essential (primary) hypertension, GERD (gastroesophageal reflux disease), Paroxysmal atrial fibrillation, and Sleep apnea.    Alireza Blum  has a past surgical history that includes Shoulder surgery.    Alireza has a current medication list which includes the following prescription(s):  cholecalciferol (vitamin d3), gabapentin, glipizide, januvia, jardiance, lisinopril, nifedipine, omeprazole, and rosuvastatin.    Review of patient's allergies indicates:  No Known Allergies     Objective   Posture                 The pt presents with mod fwd head posture and rounded shoulders. He is UT dominant with any attempted LUE overhead movement.    Shoulder Palpation  Right Shoulder Palpation  Unremarkable: Muscle, Bony Prominence/Bursa, and Tendon/Ligament          Left Shoulder Palpation  Abnormal: Muscle  Unremarkable: Bony Prominence/Bursa and Tendon/Ligament  Left Shoulder Muscle Palpation Observations: TTP of L posterior cuff, posterior deltoid, and L UT           Subcranial Range of Motion   Active Restricted? Passive Restricted? Pain   Flexion         Protraction         Retraction           Cervical SBR and Rot L AROM= 50% of WFL; Cervical Flexion= WFL; Cervical Extension= 25% of WFL; Cervical SBL and Rot R= 75% of WFL; Thoracic Rotation: R=25% of WFL with p! And L=50% of WFL    Shoulder Range of Motion  Right Shoulder   Active (deg) Passive (deg) Pain   Flexion 175 180     Extension         Scaption         ABduction 180 183     ADduction         Horizontal ABduction         Horizontal ADduction         External Rotation (Shoulder ABducted 0 degrees)         External Rotation (Shoulder ABducted 45 degrees)         External Rotation (Shoulder ABducted 90 degrees)         Internal Rotation (Shoulder ABducted 0 degrees)         Internal Rotation (Shoulder ABducted 45 degrees)         Internal Rotation (Shoulder ABducted 90 degrees)           Left Shoulder   Active (deg) Passive (deg) Pain   Flexion 105 165 Yes   Extension         Scaption         ABduction 117 180 Yes   ADduction         Horizontal ABduction         Horizontal ADduction         External Rotation (Shoulder ABducted 0 degrees)   80     External Rotation (Shoulder ABducted 45 degrees)         External Rotation (Shoulder ABducted 90  degrees)         Internal Rotation (Shoulder ABducted 0 degrees)   60 Yes   Internal Rotation (Shoulder ABducted 45 degrees)         Internal Rotation (Shoulder ABducted 90 degrees)                       Shoulder Strength - Planes of Motion   Right Strength Right Pain Left Strength Left  Pain   Flexion 4   3+ Yes   Extension           ABduction 4   3+ Yes   ADduction           Horizontal ABduction           Horizontal ADduction           Internal Rotation 0° 4+   4     Internal Rotation 90°           External Rotation 0° 4   3+     External Rotation 90°               Shoulder Strength - Scapular Stabilizing Muscles   Right Strength Right Pain Left Strength Left  Pain   Lower Trapezius 3   2+ Yes   Middle Trapezius 3+   3 Yes   Rhomboids 3+   3 Yes     Elbow Strength   Right Strength Right Pain Left Strength Left  Pain   Flexion (C6) 4+   4     Extension (C7) 4+   4            Right  Strength  Right Hand Dynamometer Position: 2  Elbow Position Forearm Position Trial 1 (lbs) Trial 2  (lbs) Trial 3  (lbs) Average  (lbs) Pain   Flexed Neutral 70 73 65 69.33         Left  Strength  Left Hand Dynamometer Position: 2  Elbow Position Forearm Position Trial 1 (lbs) Trial 2 (lbs) Trial 3 (lbs) Average (lbs) Pain   Flexed Neutral 41 38 37 38.67                Shoulder Joint Mobility            Medial NTT + on L for pain           Treatment:  Therapeutic Exercise  TE 1: retractions and shoulder extensions for green TB resistance  TE 2: thoracic rotation and chair extension for thoracic mobility      Time Entry(in minutes):  PT Evaluation (Moderate) Time Entry: 30  Therapeutic Exercise Time Entry: 10    Assessment & Plan   Assessment  Alireza presents with a condition of Moderate complexity.   Presentation of Symptoms: Stable  Will Comorbidities Impact Care: Yes  Previous L RTC sx about 10 years ago    Functional Limitations: Activity tolerance, Carrying objects, Pain when reaching, Pain with ADLs/IADLs, Range of  "motion  Impairments: Lack of appropriate home exercise program, Impaired physical strength, Pain with functional activity, Abnormal or restricted range of motion, Activity intolerance, Abnormal muscle firing  Personal Factors Affecting Prognosis: Pain    Patient Goal for Therapy (PT): "I want to be able to raise my L arm with no pain."  Prognosis: Good  Prognosis Details: The pt is motivated to improve his condition and has a good support system at home.  Assessment Details: The pt presents with cervical/thoracic/shoulder ROM deficits, LUE strength deficits, decreased stabilization of (B) shoulders, muscular tightness, and myofascial pain that can be addressed by skilled PT interventions.    Plan  From a physical therapy perspective, the patient would benefit from: Skilled Rehab Services    Planned therapy interventions include: Therapeutic exercise, Therapeutic activities, Neuromuscular re-education, Manual therapy, and ADLs/IADLs.            Visit Frequency: 2 times Per Week for 6 Weeks.       This plan was discussed with Patient.   Discussion participants: Agreed Upon Plan of Care  Plan details: Pt was educated on importance of compliance with PT sessions as well as HEP. He would benefit from skilled PT interventions at 2x a week for 6 weeks.           The patient's spiritual, cultural, and educational needs were considered, and the patient is agreeable to the plan of care and goals.     Education  Education was done with Patient. The patient's learning style includes Demonstration, Listening, and Pictures/video. The patient Demonstrates understanding and Verbalizes understanding.         Printed HEP of seated chair extensions, seated thoracic rotations, retractions (G), and shoulder ext (G)       Goals:   Active       Functional outcome       Patient will show a significant change in FOTO patient-reported outcome tool to demonstrate subjective improvement (Progressing)       Start:  05/30/25    Expected End:  " "07/11/25            Patient stated goal: "I want to be able to raise my L arm with no pain."  (Progressing)       Start:  05/30/25    Expected End:  07/11/25            Patient will demonstrate independence in home program for support of progression (Progressing)       Start:  05/30/25    Expected End:  07/11/25               Pain       Patient will report L shoulder pain at worst of 1/10 demonstrating a reduction of overall pain (Progressing)       Start:  05/30/25    Expected End:  07/11/25               Range of Motion       Patient will achieve left shoulder flexion of 160 degrees (Progressing)       Start:  05/30/25    Expected End:  07/11/25            Patient will achieve left shoulder abduction of 170 degrees (Progressing)       Start:  05/30/25    Expected End:  07/11/25               Strength       Patient will achieve left shoulder flexion strength of 4+/5 (Progressing)       Start:  05/30/25    Expected End:  07/11/25            Patient will achieve left shoulder abduction strength of 4+/5 (Progressing)       Start:  05/30/25    Expected End:  07/11/25                Jake Vazquez PT    "

## 2025-06-03 ENCOUNTER — CLINICAL SUPPORT (OUTPATIENT)
Dept: REHABILITATION | Facility: HOSPITAL | Age: 65
End: 2025-06-03
Payer: MEDICAID

## 2025-06-03 DIAGNOSIS — M25.512 PAIN IN JOINT OF LEFT SHOULDER: Primary | ICD-10-CM

## 2025-06-03 DIAGNOSIS — M53.82 DECREASED ROM OF INTERVERTEBRAL DISCS OF CERVICAL SPINE: ICD-10-CM

## 2025-06-03 DIAGNOSIS — R29.898 WEAKNESS OF LEFT SHOULDER: ICD-10-CM

## 2025-06-03 DIAGNOSIS — M25.612 DECREASED ROM OF LEFT SHOULDER: ICD-10-CM

## 2025-06-03 DIAGNOSIS — M53.84 DECREASED ROM OF THORACIC SPINE: ICD-10-CM

## 2025-06-03 PROCEDURE — 97110 THERAPEUTIC EXERCISES: CPT | Mod: CQ

## 2025-06-03 PROCEDURE — 97112 NEUROMUSCULAR REEDUCATION: CPT | Mod: CQ

## 2025-06-05 ENCOUNTER — CLINICAL SUPPORT (OUTPATIENT)
Dept: REHABILITATION | Facility: HOSPITAL | Age: 65
End: 2025-06-05
Payer: MEDICAID

## 2025-06-05 DIAGNOSIS — R29.898 WEAKNESS OF LEFT SHOULDER: ICD-10-CM

## 2025-06-05 DIAGNOSIS — M25.612 DECREASED ROM OF LEFT SHOULDER: ICD-10-CM

## 2025-06-05 DIAGNOSIS — M53.84 DECREASED ROM OF THORACIC SPINE: ICD-10-CM

## 2025-06-05 DIAGNOSIS — M25.512 PAIN IN JOINT OF LEFT SHOULDER: Primary | ICD-10-CM

## 2025-06-05 DIAGNOSIS — M53.82 DECREASED ROM OF INTERVERTEBRAL DISCS OF CERVICAL SPINE: ICD-10-CM

## 2025-06-05 PROCEDURE — 97140 MANUAL THERAPY 1/> REGIONS: CPT

## 2025-06-05 PROCEDURE — 97112 NEUROMUSCULAR REEDUCATION: CPT

## 2025-06-05 PROCEDURE — 97110 THERAPEUTIC EXERCISES: CPT

## 2025-06-10 ENCOUNTER — CLINICAL SUPPORT (OUTPATIENT)
Dept: REHABILITATION | Facility: HOSPITAL | Age: 65
End: 2025-06-10
Payer: MEDICAID

## 2025-06-10 DIAGNOSIS — M53.84 DECREASED ROM OF THORACIC SPINE: ICD-10-CM

## 2025-06-10 DIAGNOSIS — M25.612 DECREASED ROM OF LEFT SHOULDER: ICD-10-CM

## 2025-06-10 DIAGNOSIS — M25.512 PAIN IN JOINT OF LEFT SHOULDER: Primary | ICD-10-CM

## 2025-06-10 DIAGNOSIS — M53.82 DECREASED ROM OF INTERVERTEBRAL DISCS OF CERVICAL SPINE: ICD-10-CM

## 2025-06-10 DIAGNOSIS — R29.898 WEAKNESS OF LEFT SHOULDER: ICD-10-CM

## 2025-06-10 PROCEDURE — 97140 MANUAL THERAPY 1/> REGIONS: CPT | Mod: CQ

## 2025-06-10 PROCEDURE — 97112 NEUROMUSCULAR REEDUCATION: CPT | Mod: CQ

## 2025-06-10 PROCEDURE — 97110 THERAPEUTIC EXERCISES: CPT | Mod: CQ

## 2025-06-10 NOTE — PROGRESS NOTES
Outpatient Rehab    Physical Therapy Visit    Patient Name: Alireza Blum  MRN: [id number]  YOB: 1960  Encounter Date: 6/10/2025    Therapy Diagnosis:   Encounter Diagnoses   Name Primary?    Pain in joint of left shoulder Yes    Weakness of left shoulder     Decreased ROM of left shoulder     Decreased ROM of intervertebral discs of cervical spine     Decreased ROM of thoracic spine      Physician: Ketan Valdivia MD    Physician Orders: Eval and Treat  Medical Diagnosis: Pain in joint, shoulder region  Surgical Diagnosis: Not applicable for this Episode   Surgical Date: Not applicable for this Episode    Visit # / Visits Authorized:  3 / 12  Insurance Authorization Period: 6/3/2025 to 7/10/2025  Date of Evaluation: 5/30/2025  Plan of Care Certification: 5/30/2025 to 7/25/2025      PT/PTA: PTA   Number of PTA visits since last PT visit:1  Time In: 1100   Time Out: 1140  Total Time (in minutes): 40   Total Billable Time (in minutes): 40    FOTO:  Intake Score:  %  Survey Score 2:  %  Survey Score 3:  %    Precautions:       Subjective   Patient states he has no real shoulder pain today, but his feet are very painful due to DM..  Pain reported as 0/10. Soreness to (B) shoulders    Objective            Treatment:  Therapeutic Exercise  TE 1: Standing shoulder extensions and lat pull downs (Blue) for UE strengthening  TE 2: pulley (flexion and abd) for shoulder AAROM  TE 3: prone shoulder extensions (3#) for UE strength  TE 4: Thera-band rows / ext / lat pulls for strengthening  TE 5: Supine AAROM flexion and serratus punches with dowel  Manual Therapy  MT 1: (L) GHJ mobs  MT 2: L shoulder PROM  Balance/Neuromuscular Re-Education  NMR 1: posture re-ed with seated chair extensions  NMR 2: thoracic mobility re-ed with open books  NMR 3: scapulare re-ed with prone rows and T's    Time Entry(in minutes):  Manual Therapy Time Entry: 10  Neuromuscular Re-Education Time Entry: 15  Therapeutic Exercise  "Time Entry: 15    Assessment & Plan   Assessment: Patient demonstrated a positive response to treatment today, as evidenced by ability to complete all exercises with increased repetitions, good effort, and mild reports of increased pain when performing prone T's. Added Supine AAROM flexion and serratus punches to facilitate improved scapulohumeral rythm with good tolerance.Verbal and tactile cues required for form.  Evaluation/Treatment Tolerance: Patient tolerated treatment well    The patient will continue to benefit from skilled outpatient physical therapy in order to address the deficits listed in the problem list on the initial evaluation, provide patient and family education, and maximize the patients level of independence in the home and community environments.     The patient's spiritual, cultural, and educational needs were considered, and the patient is agreeable to the plan of care and goals.           Plan: Continue with current PT POC    Goals:   Active       Functional outcome       Patient will show a significant change in FOTO patient-reported outcome tool to demonstrate subjective improvement (Progressing)       Start:  05/30/25    Expected End:  07/11/25            Patient stated goal: "I want to be able to raise my L arm with no pain."  (Progressing)       Start:  05/30/25    Expected End:  07/11/25            Patient will demonstrate independence in home program for support of progression (Progressing)       Start:  05/30/25    Expected End:  07/11/25               Pain       Patient will report L shoulder pain at worst of 1/10 demonstrating a reduction of overall pain (Progressing)       Start:  05/30/25    Expected End:  07/11/25               Range of Motion       Patient will achieve left shoulder flexion of 160 degrees (Progressing)       Start:  05/30/25    Expected End:  07/11/25            Patient will achieve left shoulder abduction of 170 degrees (Progressing)       Start:  05/30/25    " Expected End:  07/11/25               Strength       Patient will achieve left shoulder flexion strength of 4+/5 (Progressing)       Start:  05/30/25    Expected End:  07/11/25            Patient will achieve left shoulder abduction strength of 4+/5 (Progressing)       Start:  05/30/25    Expected End:  07/11/25                Nick Bolanos PTA

## 2025-06-12 ENCOUNTER — HOSPITAL ENCOUNTER (OUTPATIENT)
Dept: RADIOLOGY | Facility: HOSPITAL | Age: 65
Discharge: HOME | End: 2025-06-12
Payer: MEDICAID

## 2025-06-12 ENCOUNTER — OFFICE VISIT (OUTPATIENT)
Dept: ORTHOPEDICS | Facility: CLINIC | Age: 65
End: 2025-06-12
Payer: MEDICAID

## 2025-06-12 ENCOUNTER — CLINICAL SUPPORT (OUTPATIENT)
Dept: REHABILITATION | Facility: HOSPITAL | Age: 65
End: 2025-06-12
Payer: MEDICAID

## 2025-06-12 VITALS
WEIGHT: 184.94 LBS | DIASTOLIC BLOOD PRESSURE: 70 MMHG | RESPIRATION RATE: 20 BRPM | HEIGHT: 69 IN | OXYGEN SATURATION: 96 % | BODY MASS INDEX: 27.39 KG/M2 | TEMPERATURE: 98 F | SYSTOLIC BLOOD PRESSURE: 131 MMHG | HEART RATE: 78 BPM

## 2025-06-12 DIAGNOSIS — M25.512 PAIN IN JOINT OF LEFT SHOULDER: Primary | ICD-10-CM

## 2025-06-12 DIAGNOSIS — M53.84 DECREASED ROM OF THORACIC SPINE: ICD-10-CM

## 2025-06-12 DIAGNOSIS — M75.42 IMPINGEMENT SYNDROME OF LEFT SHOULDER: Primary | ICD-10-CM

## 2025-06-12 DIAGNOSIS — M53.82 DECREASED ROM OF INTERVERTEBRAL DISCS OF CERVICAL SPINE: ICD-10-CM

## 2025-06-12 DIAGNOSIS — M25.612 DECREASED ROM OF LEFT SHOULDER: ICD-10-CM

## 2025-06-12 DIAGNOSIS — M25.512 LEFT SHOULDER PAIN: ICD-10-CM

## 2025-06-12 DIAGNOSIS — R29.898 WEAKNESS OF LEFT SHOULDER: ICD-10-CM

## 2025-06-12 PROCEDURE — 73030 X-RAY EXAM OF SHOULDER: CPT | Mod: TC,LT

## 2025-06-12 PROCEDURE — 99214 OFFICE O/P EST MOD 30 MIN: CPT | Mod: PBBFAC,25

## 2025-06-12 PROCEDURE — 97112 NEUROMUSCULAR REEDUCATION: CPT | Mod: CQ

## 2025-06-12 PROCEDURE — 97110 THERAPEUTIC EXERCISES: CPT | Mod: CQ

## 2025-06-12 PROCEDURE — 97140 MANUAL THERAPY 1/> REGIONS: CPT | Mod: CQ

## 2025-06-12 RX ORDER — MELOXICAM 7.5 MG/1
7.5 TABLET ORAL 2 TIMES DAILY PRN
Qty: 60 TABLET | Refills: 1 | Status: SHIPPED | OUTPATIENT
Start: 2025-06-12 | End: 2025-07-12

## 2025-06-12 RX ORDER — DICLOFENAC SODIUM 10 MG/G
2 GEL TOPICAL 4 TIMES DAILY
Qty: 100 G | Refills: 3 | Status: SHIPPED | OUTPATIENT
Start: 2025-06-12

## 2025-06-12 NOTE — PROGRESS NOTES
Outpatient Rehab    Physical Therapy Visit    Patient Name: Alireza Blum  MRN: [id number]  YOB: 1960  Encounter Date: 6/12/2025    Therapy Diagnosis:   Encounter Diagnoses   Name Primary?    Pain in joint of left shoulder Yes    Weakness of left shoulder     Decreased ROM of left shoulder     Decreased ROM of intervertebral discs of cervical spine     Decreased ROM of thoracic spine      Physician: Ketan Valdivia MD    Physician Orders: Eval and Treat  Medical Diagnosis: Pain in joint, shoulder region  Surgical Diagnosis: Not applicable for this Episode   Surgical Date: Not applicable for this Episode    Visit # / Visits Authorized:  4 / 12  Insurance Authorization Period: 6/3/2025 to 7/10/2025  Date of Evaluation: 5/30/2025  Plan of Care Certification: 5/30/2025 to 7/25/2025      PT/PTA: PTA   Number of PTA visits since last PT visit:2  Time In: 1015   Time Out: 1055  Total Time (in minutes): 40   Total Billable Time (in minutes): 40    FOTO:  Intake Score:  %  Survey Score 2:  %  Survey Score 3:  %    Precautions:       Subjective   Patient reports increased shoulder pain today, and has an injection scheduled for next month..  Pain reported as 4/10. Soreness to (B) shoulders    Objective            Treatment:  Therapeutic Exercise  TE 1: Standing shoulder extensions and lat pull downs (Blue) for UE strengthening  TE 2: pulley (flexion and abd) for shoulder AAROM  TE 3: prone shoulder extensions (3#) for UE strength  TE 4: Thera-band rows / ext / lat pulls for strengthening  TE 5: Supine AAROM flexion and serratus punches with dowel  Manual Therapy  MT 1: (L) GHJ mobs  MT 2: L shoulder PROM  Balance/Neuromuscular Re-Education  NMR 1: posture re-ed with seated chair extensions  NMR 2: thoracic mobility re-ed with open books  NMR 3: scapulare re-ed with prone rows and T's    Time Entry(in minutes):  Manual Therapy Time Entry: 10  Neuromuscular Re-Education Time Entry: 15  Therapeutic Exercise  "Time Entry: 15    Assessment & Plan   Assessment: Patient demonstrated a positive response to treatment today, as evidenced by ability to complete all exercises with increases made yesterday, good effort, and mild reports of increased pain when performing prone T's. Continued with supine AAROM flexion and serratus punches to facilitate improved scapulohumeral rythm with improved tolerance and movement. Verbal and tactile cues required for form.  Evaluation/Treatment Tolerance: Patient tolerated treatment well    The patient will continue to benefit from skilled outpatient physical therapy in order to address the deficits listed in the problem list on the initial evaluation, provide patient and family education, and maximize the patients level of independence in the home and community environments.     The patient's spiritual, cultural, and educational needs were considered, and the patient is agreeable to the plan of care and goals.           Plan: Continue with current PT POC    Goals:   Active       Functional outcome       Patient will show a significant change in FOTO patient-reported outcome tool to demonstrate subjective improvement (Progressing)       Start:  05/30/25    Expected End:  07/11/25            Patient stated goal: "I want to be able to raise my L arm with no pain."  (Progressing)       Start:  05/30/25    Expected End:  07/11/25            Patient will demonstrate independence in home program for support of progression (Progressing)       Start:  05/30/25    Expected End:  07/11/25               Pain       Patient will report L shoulder pain at worst of 1/10 demonstrating a reduction of overall pain (Progressing)       Start:  05/30/25    Expected End:  07/11/25               Range of Motion       Patient will achieve left shoulder flexion of 160 degrees (Progressing)       Start:  05/30/25    Expected End:  07/11/25            Patient will achieve left shoulder abduction of 170 degrees " (Progressing)       Start:  05/30/25    Expected End:  07/11/25               Strength       Patient will achieve left shoulder flexion strength of 4+/5 (Progressing)       Start:  05/30/25    Expected End:  07/11/25            Patient will achieve left shoulder abduction strength of 4+/5 (Progressing)       Start:  05/30/25    Expected End:  07/11/25                Nick Bolanos, PTA

## 2025-06-12 NOTE — PROGRESS NOTES
"Subjective:   Patient ID: Alireza Blum is a right handed 65 y.o. male  who presented to [facility] for new visit.    Chief Complaint: Pain of the Left Shoulder     History of Present Illness:  Alireza Blum with a PMHx of left shoulder rotator cuff surgery (2015) presents to the clinic today for left shoulder pain that has worsened over the last 1 year. Pain is a 4/10 located at globally. Quality of pain is described as sharp.  Radiates to arm. Inciting event: none known. Pain is aggravated by all activities, work at or above shoulder height, difficulty sleeping on affected side. Night pain yes and if sleeps on affected side. Patient has had prior shoulder problems. Evaluation to date: plain films, PCP evaluation, and PT evaluation. Treatment to date: avoidance of activity and PT (2nd week currently). Expectations for today's visit:  Further evaluation. Occupation: former . PCP: Dr. Valdivia.    Shoulder Review of Systems:  Swelling?  no  Instability?  no  Clicking?  no  Limited ROM? yes  Fever/Chills? no  Subluxation? no  Dislocation? no    Comorbid Conditions:  DM    Objective:     Physical Exam:  /70 (BP Location: Left arm, Patient Position: Sitting)   Pulse 78   Temp 97.8 °F (36.6 °C) (Oral)   Resp 20   Ht 5' 9" (1.753 m)   Wt 83.9 kg (184 lb 15.5 oz)   SpO2 96%   BMI 27.31 kg/m²     Appearance:  Soft tissue swelling: Left: no Right: no  Effusion: Left:  Negative Right: Negative  Erythema: Left no Right: no  Ecchymosis: Left: no Right: no  Atrophy: Left: yes Right: no  Scapular winging: Left: no Right: no    Palpation:  Shoulder Tenderness: Left: supraspinatus, globally  Right: none    Range of motion:  Flexion (0-90): Left:  90 Right: 90  Abduction (0-180): Left:  130 Right: 180  External rotation (0-55): Left: 30 Right: 55  Internal rotation (0-45): Left: 45 Right: 45    Strength:  Abduction: Left: 4/5 Pain: yes Right: 5/5 Pain: " no  External rotation: Left: 4/5 Pain: yes Right: 5/5 Pain: no  Internal rotation: Left: 4/5 Pain: yes Right: 5/5 Pain: no  Elbow flexion: Left: 5/5 Pain: no Right: 5/5 Pain: no  Elbow extension: Left: 5/5 Pain: no Right: 5/5 Pain: no    Special Tests:  Subacromial Impingement  Neer: Left: Positive Right: Negative  Nixon: Left: Positive Right: Negative    AC Joint Arthritis:  Cross-body abduction: Left: Positive Right: Negative    Rotator Cuff Tear   Drop arm test: Left: Negative Right: Negative  Hornblower: Left: Left: Not performed Right: Not performed   Belly press test: Left: Negative Right: Negative  Eloina test (Empty can): Left: Positive Right: Negative    Biceps tendon/Labral tear  Speed's: Left: Negative Right: Negative  Yergason's: Left: Negative Right: Negative  O'Jayson's: Left: Negative Right: Negative  Cranck test: Left: Negative Right: Negative    Stability   Sulcus sign: Left: Not performed Right: Not performed   Apprehension test: Left: Not performed Right: Not performed   Relocation test: Left: Not performed Right: Not performed     Cervical   Spurling: Left: Negative Right: Negative    AIN/PIN/Ulnar nerve: Intact and symmetric    General appearance: NAD  Peripheral pulses: normal bilaterally   Reflexes: Left: Not performed Right: Not performed   Sensation: normal    Labs:  Last A1c: 6.5     Imaging:   Previous images reviewed.  X-rays ordered and performed today: yes  # of views: 4 Laterality: right  My Interpretation:  AC joint narrowing, GH joint narrowing with high-riding humerus, and fraying device noted in the humeral head       Assessment:     Encounter Diagnoses   Code Name Primary?    M75.42 Impingement syndrome of left shoulder Yes       Plan:      MDM: Prior external referring provider notes reviewed. Prior external referring provider studies reviewed.   Dx:  Left shoulder impingement syndrome - New problem  Treatment Plan: Discussed with patient diagnosis and treatment recommendations.   Test with the patient diagnosis and conservative treatment options.  We will have him continue in formal physical therapy.  He has not been taking any anti-inflammatories therefore we will send him in a prescription for meloxicam.  Discussed CSI in the future, he is at hesitant at this point in time so would like to proceed with formal PT for a few more weeks prior to CSI.  We will have him follow up in 2-3 week for SA CSI.  May consider ordering an MRI at next visit as well for surgical planning, patient was not ready to consider surgery at today's visit.  Imaging: radiological studies ordered and independently reviewed; discussed with patient; pending radiologist interpretation.   Procedure: Discussed injection as a treatment option; discussed injections as treatment options in future if conservative measures do not improve symptoms.  Therapy: Physical Therapy  Medication: START Voltaren Gel 1% as prescribed to affected area  START meloxicam (Mobic 15 mg daily). Please see your primary care physician for further refills.  RTC:  2-3 weeks if patient would like to proceed with CSI.       This note is dictated using the M*Modal Fluency Direct word recognition program. There are word recognition mistakes that are occasionally missed on review.     Alix Medina MD  Sports Medicine Fellow

## 2025-06-17 ENCOUNTER — CLINICAL SUPPORT (OUTPATIENT)
Dept: REHABILITATION | Facility: HOSPITAL | Age: 65
End: 2025-06-17
Payer: MEDICAID

## 2025-06-17 DIAGNOSIS — R29.898 WEAKNESS OF LEFT SHOULDER: ICD-10-CM

## 2025-06-17 DIAGNOSIS — M53.84 DECREASED ROM OF THORACIC SPINE: ICD-10-CM

## 2025-06-17 DIAGNOSIS — M25.512 PAIN IN JOINT OF LEFT SHOULDER: Primary | ICD-10-CM

## 2025-06-17 DIAGNOSIS — M25.612 DECREASED ROM OF LEFT SHOULDER: ICD-10-CM

## 2025-06-17 DIAGNOSIS — M53.82 DECREASED ROM OF INTERVERTEBRAL DISCS OF CERVICAL SPINE: ICD-10-CM

## 2025-06-17 PROCEDURE — 97110 THERAPEUTIC EXERCISES: CPT

## 2025-06-17 PROCEDURE — 97112 NEUROMUSCULAR REEDUCATION: CPT

## 2025-06-17 PROCEDURE — 97140 MANUAL THERAPY 1/> REGIONS: CPT

## 2025-06-17 NOTE — PROGRESS NOTES
Outpatient Rehab    Physical Therapy Visit    Patient Name: Alireza Blum  MRN: [id number]  YOB: 1960  Encounter Date: 6/17/2025    Therapy Diagnosis:   Encounter Diagnoses   Name Primary?    Pain in joint of left shoulder Yes    Weakness of left shoulder     Decreased ROM of left shoulder     Decreased ROM of intervertebral discs of cervical spine     Decreased ROM of thoracic spine      Physician: Ketan Valdivia MD    Physician Orders: Eval and Treat  Medical Diagnosis: Pain in joint, shoulder region  Surgical Diagnosis: Not applicable for this Episode   Surgical Date: Not applicable for this Episode  Days Since Last Surgery: Not applicable for this Episode    Visit # / Visits Authorized:  5 / 12  Insurance Authorization Period: 6/3/2025 to 7/10/2025  Date of Evaluation: 5/30/2025  Plan of Care Certification: 5/30/2025 to 7/25/2025      PT/PTA: PT   Number of PTA visits since last PT visit:2  Time In: 0933   Time Out: 1015  Total Time (in minutes): 42   Total Billable Time (in minutes): 40    Subjective   The pt states his L shoulder pain still persists, but seems to be decreasing since his SOC with PT. He states he ct to do his exercises at home..  Pain reported as 4/10. Generalized L shoulder    Objective            Treatment:  Therapeutic Exercise  TE 1: Standing shoulder extensions and lat pull downs (Green) for UE strengthening  TE 2: pulley (flexion and abd) for shoulder AAROM  TE 3: supine serratus punch for shoulder stability (5#)  Manual Therapy  MT 1: (L) GHJ mobs  MT 2: L shoulder PROM  MT 3: prone thoracic and rib mobs  Balance/Neuromuscular Re-Education  NMR 1: scapular re-ed with bent over retraction and extensions (5#)  NMR 2: thoracic mobility re-ed with open books  NMR 3: bent over T for scapular re-ed    Time Entry(in minutes):  Manual Therapy Time Entry: 10  Neuromuscular Re-Education Time Entry: 15  Therapeutic Exercise Time Entry: 15    Assessment & Plan   Assessment: The  "pt was progressed to 5# resistance with bent over retractions, extensions, and supine serratus punches. He ct to progress well with overall L shoulder ROM and strength. He did require tactile and verbal cueing from PT in order to complete serratus punch and shoulder extensions correctly.        The patient will continue to benefit from skilled outpatient physical therapy in order to address the deficits listed in the problem list on the initial evaluation, provide patient and family education, and maximize the patients level of independence in the home and community environments.     The patient's spiritual, cultural, and educational needs were considered, and the patient is agreeable to the plan of care and goals.           Plan: Continue with current PT POC.    Goals:   Active       Functional outcome       Patient will show a significant change in FOTO patient-reported outcome tool to demonstrate subjective improvement (Progressing)       Start:  05/30/25    Expected End:  07/11/25            Patient stated goal: "I want to be able to raise my L arm with no pain."  (Progressing)       Start:  05/30/25    Expected End:  07/11/25            Patient will demonstrate independence in home program for support of progression (Progressing)       Start:  05/30/25    Expected End:  07/11/25               Pain       Patient will report L shoulder pain at worst of 1/10 demonstrating a reduction of overall pain (Progressing)       Start:  05/30/25    Expected End:  07/11/25               Range of Motion       Patient will achieve left shoulder flexion of 160 degrees (Progressing)       Start:  05/30/25    Expected End:  07/11/25            Patient will achieve left shoulder abduction of 170 degrees (Progressing)       Start:  05/30/25    Expected End:  07/11/25               Strength       Patient will achieve left shoulder flexion strength of 4+/5 (Progressing)       Start:  05/30/25    Expected End:  07/11/25            " Patient will achieve left shoulder abduction strength of 4+/5 (Progressing)       Start:  05/30/25    Expected End:  07/11/25                Jake Vazquez PT

## 2025-06-18 NOTE — PROGRESS NOTES
Faculty Attestation: Alireza Blum  was seen in Sports Medicine Clinic. Services were furnished in a primary care sports medicine center in the outpatient department at a [facility] hospital. Discussed with Dr. Mednia at the time of the visit. History of Present Illness, Physical Exam, and Assessment and Plan reviewed.  I participated in the management of the patient and was immediately available throughout the encounter. Treatment plan is reasonable and appropriate. Compliance with treatment recommendations is important.    Radiology images independently reviewed and agree with radiologist interpretation. No procedure was performed.     Susan Marquez MD  Sports Medicine      Insurance: Medicaid  Patient Type: New patient to [facility]  Problem Type: New condition to [facility]  Condition: New Condition

## 2025-07-01 ENCOUNTER — CLINICAL SUPPORT (OUTPATIENT)
Dept: REHABILITATION | Facility: HOSPITAL | Age: 65
End: 2025-07-01
Payer: MEDICAID

## 2025-07-01 DIAGNOSIS — M53.84 DECREASED ROM OF THORACIC SPINE: ICD-10-CM

## 2025-07-01 DIAGNOSIS — M25.512 PAIN IN JOINT OF LEFT SHOULDER: Primary | ICD-10-CM

## 2025-07-01 DIAGNOSIS — R29.898 WEAKNESS OF LEFT SHOULDER: ICD-10-CM

## 2025-07-01 DIAGNOSIS — M53.82 DECREASED ROM OF INTERVERTEBRAL DISCS OF CERVICAL SPINE: ICD-10-CM

## 2025-07-01 DIAGNOSIS — M25.612 DECREASED ROM OF LEFT SHOULDER: ICD-10-CM

## 2025-07-01 PROCEDURE — 97112 NEUROMUSCULAR REEDUCATION: CPT

## 2025-07-01 PROCEDURE — 97140 MANUAL THERAPY 1/> REGIONS: CPT

## 2025-07-01 PROCEDURE — 97110 THERAPEUTIC EXERCISES: CPT

## 2025-07-01 NOTE — LETTER
July 1, 2025  Ketan Valdivia MD  [address]  [place] [address]      To whom it may concern,     The attached plan of care is being sent to you for review and reference.    You may indicate your approval by signing the document electronically, or by faxing/mailing a signed copy of the final page of this document back to the attention of Jake Vazquez, PT:         Plan of Care 7/1/25   Effective from: 7/1/2025  Effective to: 8/12/2025    Plan ID: [id number]            Participants as of Finalize on 7/1/2025    Name Type Comments Contact Info    Ketan Valdivia MD PCP - General  [phone]    Jake Vazquez, PT Physical Therapist         Last Plan Note     Author: Jake Vazquez, PT Status: Signed Last edited: 7/1/2025 11:00 AM         Outpatient Rehab    Physical Therapy Progress Note : Updated Plan of Care    Patient Name: Alireza Blum  MRN: [id number]  YOB: 1960  Encounter Date: 7/1/2025    Therapy Diagnosis:   Encounter Diagnoses   Name Primary?    Pain in joint of left shoulder Yes    Weakness of left shoulder     Decreased ROM of left shoulder     Decreased ROM of intervertebral discs of cervical spine     Decreased ROM of thoracic spine      Physician: Ketan Valdivia MD    Physician Orders: Eval and Treat  Medical Diagnosis: Pain in joint, shoulder region  Surgical Diagnosis: Not applicable for this Episode   Surgical Date: Not applicable for this Episode  Days Since Last Surgery: Not applicable for this Episode    Visit # / Visits Authorized:  6 / 12  Insurance Authorization Period: 6/3/2025 to 7/10/2025  Date of Evaluation: 5/30/2025   Plan of Care Certification: 5/30/2025 to 7/25/2025 ; updated POC 2 wk 6 (07/01/25-TBD)     PT/PTA: PT   Number of PTA visits since last PT visit:0  Time In: 1100   Time Out: 1145  Total Time (in minutes): 45   Total Billable Time (in minutes): 40    FOTO:  Intake Score: 46%  Survey Score 2: 47%      Subjective   The  pt states his L shoulder bothered him while being out of town due to a ..  Pain reported as 6/10. Generalized L shoulder    Objective      Shoulder Palpation  Right Shoulder Palpation  Unremarkable: Muscle, Bony Prominence/Bursa, and Tendon/Ligament          Left Shoulder Palpation  Abnormal: Muscle  Unremarkable: Bony Prominence/Bursa and Tendon/Ligament  Left Shoulder Muscle Palpation Observations: TTP of L posterior cuff, posterior deltoid, and L UT           Subcranial Range of Motion   Active Restricted? Passive Restricted? Pain   Flexion         Protraction         Retraction           Cervical SBR and Rot L AROM= 50% of WFL; Cervical Flexion= WFL; Cervical Extension= 45% of WFL; Cervical SBL and Rot R= 75% of WFL; Thoracic Rotation: R=35% of WFL with p! And L=50% of WFL    Shoulder Range of Motion  Right Shoulder   Active (deg) Passive (deg) Pain   Flexion 175 180     Extension         Scaption         ABduction 180 183     ADduction         Horizontal ABduction         Horizontal ADduction         External Rotation (Shoulder ABducted 0 degrees)         External Rotation (Shoulder ABducted 45 degrees)         External Rotation (Shoulder ABducted 90 degrees)         Internal Rotation (Shoulder ABducted 0 degrees)         Internal Rotation (Shoulder ABducted 45 degrees)         Internal Rotation (Shoulder ABducted 90 degrees)           Left Shoulder   Active (deg) Passive (deg) Pain   Flexion 123 178 Yes   Extension         Scaption         ABduction 128 180 Yes   ADduction         Horizontal ABduction         Horizontal ADduction         External Rotation (Shoulder ABducted 0 degrees)   80     External Rotation (Shoulder ABducted 45 degrees)         External Rotation (Shoulder ABducted 90 degrees)         Internal Rotation (Shoulder ABducted 0 degrees)   65     Internal Rotation (Shoulder ABducted 45 degrees)         Internal Rotation (Shoulder ABducted 90 degrees)                       Shoulder  "Strength - Planes of Motion   Right Strength Right Pain Left Strength Left  Pain   Flexion 4   3+     Extension           ABduction 4   3+     ADduction           Horizontal ABduction           Horizontal ADduction           Internal Rotation 0° 4+   4     Internal Rotation 90°           External Rotation 0° 4   3+     External Rotation 90°               Shoulder Strength - Scapular Stabilizing Muscles   Right Strength Right Pain Left Strength Left  Pain   Lower Trapezius 3+   3 Yes   Middle Trapezius 3+   3 Yes   Rhomboids 3+   3 Yes                  Treatment:  Therapeutic Exercise  TE 1: Standing shoulder extensions and lat pull downs (Blue) for UE strengthening  TE 2: pulley (flexion and abd) for shoulder AAROM  TE 3: supine serratus punch for shoulder stability with PT resistance  TE 4: seated stick flexion AAROM for shoulder ROM  Manual Therapy  MT 1: (L) GHJ mobs  MT 2: L shoulder PROM  MT 3: L shoulder perturbations at ~90 degrees of L shoulder flexion with protraction (4x30")  Balance/Neuromuscular Re-Education  NMR 1: scapular re-ed with bent over retraction and extensions (5#)  NMR 2: thoracic mobility re-ed with open books  NMR 3: bent over T for scapular re-ed    Time Entry(in minutes):  Manual Therapy Time Entry: 10  Neuromuscular Re-Education Time Entry: 15  Therapeutic Exercise Time Entry: 15    Assessment & Plan   Plan  From a physical therapy perspective, the patient would benefit from: Skilled Rehab Services    Planned therapy interventions include: Therapeutic exercise, Therapeutic activities, Neuromuscular re-education, Manual therapy, and ADLs/IADLs.            Visit Frequency: 2 times Per Week for 6 Weeks.       This plan was discussed with Patient.   Discussion participants: Agreed Upon Plan of Care  Plan details: Pt was educated on importance of compliance with PT sessions as well as HEP. He would benefit from ct skilled PT interventions at 2x a week for 6 weeks.       The pt has shown " "progress with scapular stabilizers and L shoulder ROM (P/AROM) since his SOC with PT. His pain levels have remained around 2-6/10 level depending on the day, but today was a 6/10 and he attributes this increase in pain reported due to not coming to PT last week due to having to go out of town for a . He reports compliance with HEP and would ct to benefit from further skilled PT interventions aimed at improving LUE strength/stability, L shoulder ROM, and decreasing overall pain reports.    The patient will continue to benefit from skilled outpatient physical therapy in order to address the deficits listed in the problem list on the initial evaluation, provide patient and family education, and maximize the patients level of independence in the home and community environments.     The patient's spiritual, cultural, and educational needs were considered, and the patient is agreeable to the plan of care and goals.           Goals:   Active       Functional outcome       Patient will show a significant change in FOTO patient-reported outcome tool to demonstrate subjective improvement (Ongoing)       Start:  25    Expected End:  25            Patient stated goal: "I want to be able to raise my L arm with no pain."  (Ongoing)       Start:  25    Expected End:  25            Patient will demonstrate independence in home program for support of progression (Progressing)       Start:  25    Expected End:  25               Pain       Patient will report L shoulder pain at worst of 1/10 demonstrating a reduction of overall pain (Ongoing)       Start:  25    Expected End:  25               Range of Motion       Patient will achieve left shoulder flexion of 160 degrees (Progressing)       Start:  25    Expected End:  25            Patient will achieve left shoulder abduction of 170 degrees (Progressing)       Start:  25    Expected End:  25          "      Strength       Patient will achieve left shoulder flexion strength of 4+/5 (Ongoing)       Start:  05/30/25    Expected End:  07/11/25            Patient will achieve left shoulder abduction strength of 4+/5 (Ongoing)       Start:  05/30/25    Expected End:  07/11/25                Jake Vazquez PT           Current Participants as of 7/1/2025    Name Type Comments Contact Info    Ketan Valdivia MD PCP - General  [phone]    Signature pending    Jake Vazquez PT Physical Therapist                  Sincerely,      Jake Vazquez PT  [facility]                                                            Dear Jake Vazquez PT,    RE: Mr. Alireza Blum, MRN: [id number]    I certify that I have reviewed the attached plan of care and agree to the details within.        ___________________________  ___________________________  Provider Printed Name   Provider Signed Name      ___________________________  Date and Time

## 2025-07-01 NOTE — PROGRESS NOTES
Outpatient Rehab    Physical Therapy Progress Note : Updated Plan of Care    Patient Name: Alireza Blum  MRN: [id number]  YOB: 1960  Encounter Date: 2025    Therapy Diagnosis:   Encounter Diagnoses   Name Primary?    Pain in joint of left shoulder Yes    Weakness of left shoulder     Decreased ROM of left shoulder     Decreased ROM of intervertebral discs of cervical spine     Decreased ROM of thoracic spine      Physician: Ketan Valdivia MD    Physician Orders: Eval and Treat  Medical Diagnosis: Pain in joint, shoulder region  Surgical Diagnosis: Not applicable for this Episode   Surgical Date: Not applicable for this Episode  Days Since Last Surgery: Not applicable for this Episode    Visit # / Visits Authorized:    Insurance Authorization Period: 6/3/2025 to 7/10/2025  Date of Evaluation: 2025   Plan of Care Certification: 2025 to 2025 ; updated POC 2 wk 6 (25-TBD)     PT/PTA: PT   Number of PTA visits since last PT visit:0  Time In: 1100   Time Out: 1145  Total Time (in minutes): 45   Total Billable Time (in minutes): 40    FOTO:  Intake Score: 46%  Survey Score 2: 47%      Subjective   The pt states his L shoulder bothered him while being out of town due to a ..  Pain reported as 6/10. Generalized L shoulder    Objective      Shoulder Palpation  Right Shoulder Palpation  Unremarkable: Muscle, Bony Prominence/Bursa, and Tendon/Ligament          Left Shoulder Palpation  Abnormal: Muscle  Unremarkable: Bony Prominence/Bursa and Tendon/Ligament  Left Shoulder Muscle Palpation Observations: TTP of L posterior cuff, posterior deltoid, and L UT           Subcranial Range of Motion   Active Restricted? Passive Restricted? Pain   Flexion         Protraction         Retraction           Cervical SBR and Rot L AROM= 50% of WFL; Cervical Flexion= WFL; Cervical Extension= 45% of WFL; Cervical SBL and Rot R= 75% of WFL; Thoracic Rotation: R=35% of WFL with p! And  L=50% of WFL    Shoulder Range of Motion  Right Shoulder   Active (deg) Passive (deg) Pain   Flexion 175 180     Extension         Scaption         ABduction 180 183     ADduction         Horizontal ABduction         Horizontal ADduction         External Rotation (Shoulder ABducted 0 degrees)         External Rotation (Shoulder ABducted 45 degrees)         External Rotation (Shoulder ABducted 90 degrees)         Internal Rotation (Shoulder ABducted 0 degrees)         Internal Rotation (Shoulder ABducted 45 degrees)         Internal Rotation (Shoulder ABducted 90 degrees)           Left Shoulder   Active (deg) Passive (deg) Pain   Flexion 123 178 Yes   Extension         Scaption         ABduction 128 180 Yes   ADduction         Horizontal ABduction         Horizontal ADduction         External Rotation (Shoulder ABducted 0 degrees)   80     External Rotation (Shoulder ABducted 45 degrees)         External Rotation (Shoulder ABducted 90 degrees)         Internal Rotation (Shoulder ABducted 0 degrees)   65     Internal Rotation (Shoulder ABducted 45 degrees)         Internal Rotation (Shoulder ABducted 90 degrees)                       Shoulder Strength - Planes of Motion   Right Strength Right Pain Left Strength Left  Pain   Flexion 4   3+     Extension           ABduction 4   3+     ADduction           Horizontal ABduction           Horizontal ADduction           Internal Rotation 0° 4+   4     Internal Rotation 90°           External Rotation 0° 4   3+     External Rotation 90°               Shoulder Strength - Scapular Stabilizing Muscles   Right Strength Right Pain Left Strength Left  Pain   Lower Trapezius 3+   3 Yes   Middle Trapezius 3+   3 Yes   Rhomboids 3+   3 Yes                  Treatment:  Therapeutic Exercise  TE 1: Standing shoulder extensions and lat pull downs (Blue) for UE strengthening  TE 2: pulley (flexion and abd) for shoulder AAROM  TE 3: supine serratus punch for shoulder stability with PT  "resistance  TE 4: seated stick flexion AAROM for shoulder ROM  Manual Therapy  MT 1: (L) GHJ mobs  MT 2: L shoulder PROM  MT 3: L shoulder perturbations at ~90 degrees of L shoulder flexion with protraction (4x30")  Balance/Neuromuscular Re-Education  NMR 1: scapular re-ed with bent over retraction and extensions (5#)  NMR 2: thoracic mobility re-ed with open books  NMR 3: bent over T for scapular re-ed    Time Entry(in minutes):  Manual Therapy Time Entry: 10  Neuromuscular Re-Education Time Entry: 15  Therapeutic Exercise Time Entry: 15    Assessment & Plan   Plan  From a physical therapy perspective, the patient would benefit from: Skilled Rehab Services    Planned therapy interventions include: Therapeutic exercise, Therapeutic activities, Neuromuscular re-education, Manual therapy, and ADLs/IADLs.            Visit Frequency: 2 times Per Week for 6 Weeks.       This plan was discussed with Patient.   Discussion participants: Agreed Upon Plan of Care  Plan details: Pt was educated on importance of compliance with PT sessions as well as HEP. He would benefit from ct skilled PT interventions at 2x a week for 6 weeks.       The pt has shown progress with scapular stabilizers and L shoulder ROM (P/AROM) since his SOC with PT. His pain levels have remained around 2-6/10 level depending on the day, but today was a 6/10 and he attributes this increase in pain reported due to not coming to PT last week due to having to go out of town for a . He reports compliance with HEP and would ct to benefit from further skilled PT interventions aimed at improving LUE strength/stability, L shoulder ROM, and decreasing overall pain reports.    The patient will continue to benefit from skilled outpatient physical therapy in order to address the deficits listed in the problem list on the initial evaluation, provide patient and family education, and maximize the patients level of independence in the home and community " "environments.     The patient's spiritual, cultural, and educational needs were considered, and the patient is agreeable to the plan of care and goals.           Goals:   Active       Functional outcome       Patient will show a significant change in FOTO patient-reported outcome tool to demonstrate subjective improvement (Ongoing)       Start:  05/30/25    Expected End:  07/11/25            Patient stated goal: "I want to be able to raise my L arm with no pain."  (Ongoing)       Start:  05/30/25    Expected End:  07/11/25            Patient will demonstrate independence in home program for support of progression (Progressing)       Start:  05/30/25    Expected End:  07/11/25               Pain       Patient will report L shoulder pain at worst of 1/10 demonstrating a reduction of overall pain (Ongoing)       Start:  05/30/25    Expected End:  07/11/25               Range of Motion       Patient will achieve left shoulder flexion of 160 degrees (Progressing)       Start:  05/30/25    Expected End:  07/11/25            Patient will achieve left shoulder abduction of 170 degrees (Progressing)       Start:  05/30/25    Expected End:  07/11/25               Strength       Patient will achieve left shoulder flexion strength of 4+/5 (Ongoing)       Start:  05/30/25    Expected End:  07/11/25            Patient will achieve left shoulder abduction strength of 4+/5 (Ongoing)       Start:  05/30/25    Expected End:  07/11/25                Jake Vazquez PT    "

## 2025-07-03 ENCOUNTER — CLINICAL SUPPORT (OUTPATIENT)
Dept: REHABILITATION | Facility: HOSPITAL | Age: 65
End: 2025-07-03
Payer: MEDICAID

## 2025-07-03 DIAGNOSIS — M25.612 DECREASED ROM OF LEFT SHOULDER: ICD-10-CM

## 2025-07-03 DIAGNOSIS — M53.82 DECREASED ROM OF INTERVERTEBRAL DISCS OF CERVICAL SPINE: ICD-10-CM

## 2025-07-03 DIAGNOSIS — M53.84 DECREASED ROM OF THORACIC SPINE: ICD-10-CM

## 2025-07-03 DIAGNOSIS — R29.898 WEAKNESS OF LEFT SHOULDER: ICD-10-CM

## 2025-07-03 DIAGNOSIS — M25.512 PAIN IN JOINT OF LEFT SHOULDER: Primary | ICD-10-CM

## 2025-07-03 PROCEDURE — 97140 MANUAL THERAPY 1/> REGIONS: CPT | Mod: CQ

## 2025-07-03 PROCEDURE — 97110 THERAPEUTIC EXERCISES: CPT | Mod: CQ

## 2025-07-03 PROCEDURE — 97112 NEUROMUSCULAR REEDUCATION: CPT | Mod: CQ

## 2025-07-03 NOTE — PROGRESS NOTES
Outpatient Rehab    Physical Therapy Visit    Patient Name: Alireza Blum  MRN: [id number]  YOB: 1960  Encounter Date: 7/3/2025    Therapy Diagnosis:   Encounter Diagnoses   Name Primary?    Pain in joint of left shoulder Yes    Weakness of left shoulder     Decreased ROM of left shoulder     Decreased ROM of intervertebral discs of cervical spine     Decreased ROM of thoracic spine      Physician: Ketan Valdivia MD    Physician Orders: Eval and Treat  Medical Diagnosis: Pain in joint, shoulder region  Surgical Diagnosis: Not applicable for this Episode   Surgical Date: Not applicable for this Episode  Days Since Last Surgery: Not applicable for this Episode    Visit # / Visits Authorized:  7 / 12  Insurance Authorization Period: 6/3/2025 to 7/10/2025  Date of Evaluation: 5/30/2025  Plan of Care Certification: 7/1/2025 to 8/12/2025      PT/PTA: PTA   Number of PTA visits since last PT visit:1  Time In: 0930   Time Out: 1015  Total Time (in minutes): 45   Total Billable Time (in minutes): 45    FOTO:  Intake Score:  %  Survey Score 2:  %  Survey Score 3:  %    Precautions:       Subjective   Pt reports his shoulder is not as painful today as it was last time. States he started feeling better after last session..  Pain reported as 4/10. Generalized L shoulder    Objective            Treatment:  Therapeutic Exercise  TE 1: Standing shoulder extensions and lat pull downs (Blue) for UE strengthening  TE 2: pulley (flexion and abd) for shoulder AAROM  TE 3: supine serratus punch for shoulder stability with PT resistance  TE 4: seated stick flexion AAROM for shoulder ROM  Manual Therapy  MT 1: (L) GHJ mobs  MT 2: L shoulder PROM  Balance/Neuromuscular Re-Education  NMR 1: scapular re-ed with bent over retraction and extensions (5#)  NMR 2: thoracic mobility re-ed with open books  NMR 3: bent over T for scapular re-ed    Time Entry(in minutes):  Manual Therapy Time Entry: 10  Neuromuscular Re-Education  "Time Entry: 15  Therapeutic Exercise Time Entry: 20    Assessment & Plan   Assessment: Patient demonstrated a positive response to treatment today, as evidenced by ability to complete all exercises with good effort, and mild reports of mild pain and crepitus when performing T's. Continued with supine resisted flexion and serratus punches to facilitate improved scapulohumeral rythm with improved tolerance and movement. Verbal and tactile cues required for form.  Evaluation/Treatment Tolerance: Patient tolerated treatment well    The patient will continue to benefit from skilled outpatient physical therapy in order to address the deficits listed in the problem list on the initial evaluation, provide patient and family education, and maximize the patients level of independence in the home and community environments.     The patient's spiritual, cultural, and educational needs were considered, and the patient is agreeable to the plan of care and goals.           Plan: Continue with current PT POC.    Goals:   Active       Functional outcome       Patient will show a significant change in FOTO patient-reported outcome tool to demonstrate subjective improvement (Progressing)       Start:  05/30/25    Expected End:  07/11/25            Patient stated goal: "I want to be able to raise my L arm with no pain."  (Progressing)       Start:  05/30/25    Expected End:  07/11/25            Patient will demonstrate independence in home program for support of progression (Progressing)       Start:  05/30/25    Expected End:  07/11/25               Pain       Patient will report L shoulder pain at worst of 1/10 demonstrating a reduction of overall pain (Progressing)       Start:  05/30/25    Expected End:  07/11/25               Range of Motion       Patient will achieve left shoulder flexion of 160 degrees (Progressing)       Start:  05/30/25    Expected End:  07/11/25            Patient will achieve left shoulder abduction of 170 " degrees (Progressing)       Start:  05/30/25    Expected End:  07/11/25               Strength       Patient will achieve left shoulder flexion strength of 4+/5 (Progressing)       Start:  05/30/25    Expected End:  07/11/25            Patient will achieve left shoulder abduction strength of 4+/5 (Progressing)       Start:  05/30/25    Expected End:  07/11/25                Nick Bolanos, PTA

## 2025-07-08 ENCOUNTER — CLINICAL SUPPORT (OUTPATIENT)
Dept: REHABILITATION | Facility: HOSPITAL | Age: 65
End: 2025-07-08
Payer: MEDICAID

## 2025-07-08 DIAGNOSIS — M53.84 DECREASED ROM OF THORACIC SPINE: ICD-10-CM

## 2025-07-08 DIAGNOSIS — M53.82 DECREASED ROM OF INTERVERTEBRAL DISCS OF CERVICAL SPINE: ICD-10-CM

## 2025-07-08 DIAGNOSIS — R29.898 WEAKNESS OF LEFT SHOULDER: ICD-10-CM

## 2025-07-08 DIAGNOSIS — M25.612 DECREASED ROM OF LEFT SHOULDER: ICD-10-CM

## 2025-07-08 DIAGNOSIS — M25.512 PAIN IN JOINT OF LEFT SHOULDER: Primary | ICD-10-CM

## 2025-07-08 PROCEDURE — 97110 THERAPEUTIC EXERCISES: CPT | Mod: CQ

## 2025-07-08 PROCEDURE — 97112 NEUROMUSCULAR REEDUCATION: CPT | Mod: CQ

## 2025-07-08 NOTE — PROGRESS NOTES
Outpatient Rehab    Physical Therapy Visit    Patient Name: Alireza Blum  MRN: [id number]  YOB: 1960  Encounter Date: 7/8/2025    Therapy Diagnosis:   Encounter Diagnoses   Name Primary?    Pain in joint of left shoulder Yes    Weakness of left shoulder     Decreased ROM of left shoulder     Decreased ROM of intervertebral discs of cervical spine     Decreased ROM of thoracic spine      Physician: Ketan Valdivia MD    Physician Orders: Eval and Treat  Medical Diagnosis: Pain in joint, shoulder region  Surgical Diagnosis: Not applicable for this Episode   Surgical Date: Not applicable for this Episode  Days Since Last Surgery: Not applicable for this Episode    Visit # / Visits Authorized:  8 / 24  Insurance Authorization Period: 6/3/2025 to 8/29/2025  Date of Evaluation: 5/30/2025  Plan of Care Certification: 7/1/2025 to 8/12/2025      PT/PTA: PTA   Number of PTA visits since last PT visit:2  Time In: 0930   Time Out: 1015  Total Time (in minutes): 45   Total Billable Time (in minutes): 45    FOTO:  Intake Score:  %  Survey Score 2:  %  Survey Score 3:  %    Precautions:       Subjective   Patient states the L shoulder remains sore today, and it has been for about a week or so..  Pain reported as 6/10. Generalized L shoulder    Objective            Treatment:  Therapeutic Exercise  TE 1: Standing shoulder extensions and lat pull downs (Blue) for UE strengthening  TE 2: pulley (flexion and abd) for shoulder AAROM  TE 3: supine serratus punch for shoulder stability  TE 4: seated stick flexion AAROM for shoulder ROM  TE 5: Supine AAROM flexion and serratus punches with dowel  Manual Therapy  MT 1: (L) GHJ mobs  MT 2: L shoulder PROM  Balance/Neuromuscular Re-Education  NMR 2: thoracic mobility re-ed with open books  NMR 3: Supine circles with weighted ball    Time Entry(in minutes):  Manual Therapy Time Entry: 10  Neuromuscular Re-Education Time Entry: 10  Therapeutic Exercise Time Entry:  "25    Assessment & Plan   Assessment: Patient demonstrated a positive response to treatment today, as evidenced by ability to complete all exercises with good effort, increased repetitions, and mild reports of mild pain. Added shelf raises, and supine wall circles to facilitate improved scapulohumeral rhythm and shoulder stability with good tolerance. Verbal and tactile cues required for form.  Evaluation/Treatment Tolerance: Patient tolerated treatment well    The patient will continue to benefit from skilled outpatient physical therapy in order to address the deficits listed in the problem list on the initial evaluation, provide patient and family education, and maximize the patients level of independence in the home and community environments.     The patient's spiritual, cultural, and educational needs were considered, and the patient is agreeable to the plan of care and goals.           Plan: Continue with current PT POC.    Goals:   Active       Functional outcome       Patient will show a significant change in FOTO patient-reported outcome tool to demonstrate subjective improvement (Ongoing)       Start:  05/30/25    Expected End:  07/11/25            Patient stated goal: "I want to be able to raise my L arm with no pain."  (Ongoing)       Start:  05/30/25    Expected End:  07/11/25            Patient will demonstrate independence in home program for support of progression (Ongoing)       Start:  05/30/25    Expected End:  07/11/25               Pain       Patient will report L shoulder pain at worst of 1/10 demonstrating a reduction of overall pain (Ongoing)       Start:  05/30/25    Expected End:  07/11/25               Range of Motion       Patient will achieve left shoulder flexion of 160 degrees (Ongoing)       Start:  05/30/25    Expected End:  07/11/25            Patient will achieve left shoulder abduction of 170 degrees (Ongoing)       Start:  05/30/25    Expected End:  07/11/25               " Strength       Patient will achieve left shoulder flexion strength of 4+/5 (Ongoing)       Start:  05/30/25    Expected End:  07/11/25            Patient will achieve left shoulder abduction strength of 4+/5 (Ongoing)       Start:  05/30/25    Expected End:  07/11/25                Nick Bolanos, PTA

## 2025-07-10 ENCOUNTER — CLINICAL SUPPORT (OUTPATIENT)
Dept: REHABILITATION | Facility: HOSPITAL | Age: 65
End: 2025-07-10
Payer: MEDICAID

## 2025-07-10 DIAGNOSIS — M25.612 DECREASED ROM OF LEFT SHOULDER: ICD-10-CM

## 2025-07-10 DIAGNOSIS — M53.84 DECREASED ROM OF THORACIC SPINE: ICD-10-CM

## 2025-07-10 DIAGNOSIS — R29.898 WEAKNESS OF LEFT SHOULDER: ICD-10-CM

## 2025-07-10 DIAGNOSIS — M53.82 DECREASED ROM OF INTERVERTEBRAL DISCS OF CERVICAL SPINE: ICD-10-CM

## 2025-07-10 DIAGNOSIS — M25.512 PAIN IN JOINT OF LEFT SHOULDER: Primary | ICD-10-CM

## 2025-07-10 PROCEDURE — 97140 MANUAL THERAPY 1/> REGIONS: CPT | Mod: CQ

## 2025-07-10 PROCEDURE — 97112 NEUROMUSCULAR REEDUCATION: CPT | Mod: CQ

## 2025-07-10 PROCEDURE — 97110 THERAPEUTIC EXERCISES: CPT | Mod: CQ

## 2025-07-10 NOTE — PROGRESS NOTES
"  Outpatient Rehab    Physical Therapy Visit    Patient Name: Alireza Blum  MRN: [id number]  YOB: 1960  Encounter Date: 7/10/2025    Therapy Diagnosis:   Encounter Diagnoses   Name Primary?    Pain in joint of left shoulder Yes    Weakness of left shoulder     Decreased ROM of left shoulder     Decreased ROM of intervertebral discs of cervical spine     Decreased ROM of thoracic spine      Physician: Ketan Valdivia MD    Physician Orders: Eval and Treat  Medical Diagnosis: Pain in joint, shoulder region  Surgical Diagnosis: Not applicable for this Episode   Surgical Date: Not applicable for this Episode  Days Since Last Surgery: Not applicable for this Episode    Visit # / Visits Authorized:  9 / 24  Insurance Authorization Period: 6/3/2025 to 8/29/2025  Date of Evaluation: 5/30/2025  Plan of Care Certification: 7/1/2025 to 8/12/2025      PT/PTA: PTA   Number of PTA visits since last PT visit:3  Time In: 0935   Time Out: 1015  Total Time (in minutes): 40   Total Billable Time (in minutes): 40    FOTO:  Intake Score:  %  Survey Score 2:  %  Survey Score 3:  %    Precautions:       Subjective   Patient states he had some initial "tingling" in the L shoulder after last session, but has been without pain for about a day now. States he felt like the adjustments made last session helped a lot..  Pain reported as 0/10. Generalized L shoulder    Objective            Treatment:  Therapeutic Exercise  TE 1: Standing shoulder extensions and lat pull downs (Blue) for UE strengthening  TE 2: pulley (flexion and abd) for shoulder AAROM  TE 3: supine serratus punch for shoulder stability  TE 4: seated stick flexion AAROM for shoulder ROM  TE 5: Supine AAROM flexion and serratus punches with dowel  Manual Therapy  MT 1: (L) GHJ mobs  MT 2: L shoulder PROM  Balance/Neuromuscular Re-Education  NMR 2: thoracic mobility re-ed with open books  NMR 3: Supine circles with weighted ball    Time Entry(in " "minutes):  Manual Therapy Time Entry: 10  Neuromuscular Re-Education Time Entry: 10  Therapeutic Exercise Time Entry: 20    Assessment & Plan   Assessment: Patient demonstrated a positive response to treatment today, as evidenced by ability to complete all exercises with good effort, and mild reports of increased pain. Continued with exercises added last session to facilitate improved scapulohumeral rhythm and shoulder stability with good tolerance. Verbal and tactile cues required for form.  Evaluation/Treatment Tolerance: Patient tolerated treatment well    The patient will continue to benefit from skilled outpatient physical therapy in order to address the deficits listed in the problem list on the initial evaluation, provide patient and family education, and maximize the patients level of independence in the home and community environments.     The patient's spiritual, cultural, and educational needs were considered, and the patient is agreeable to the plan of care and goals.           Plan: Continue with current PT POC.    Goals:   Active       Functional outcome       Patient will show a significant change in FOTO patient-reported outcome tool to demonstrate subjective improvement (Progressing)       Start:  05/30/25    Expected End:  07/11/25            Patient stated goal: "I want to be able to raise my L arm with no pain."  (Progressing)       Start:  05/30/25    Expected End:  07/11/25            Patient will demonstrate independence in home program for support of progression (Progressing)       Start:  05/30/25    Expected End:  07/11/25               Pain       Patient will report L shoulder pain at worst of 1/10 demonstrating a reduction of overall pain (Progressing)       Start:  05/30/25    Expected End:  07/11/25               Range of Motion       Patient will achieve left shoulder flexion of 160 degrees (Progressing)       Start:  05/30/25    Expected End:  07/11/25            Patient will achieve " left shoulder abduction of 170 degrees (Progressing)       Start:  05/30/25    Expected End:  07/11/25               Strength       Patient will achieve left shoulder flexion strength of 4+/5 (Progressing)       Start:  05/30/25    Expected End:  07/11/25            Patient will achieve left shoulder abduction strength of 4+/5 (Progressing)       Start:  05/30/25    Expected End:  07/11/25                Nick Bolanos, PTA

## 2025-07-15 ENCOUNTER — CLINICAL SUPPORT (OUTPATIENT)
Dept: REHABILITATION | Facility: HOSPITAL | Age: 65
End: 2025-07-15
Payer: MEDICAID

## 2025-07-15 DIAGNOSIS — M25.612 DECREASED ROM OF LEFT SHOULDER: ICD-10-CM

## 2025-07-15 DIAGNOSIS — M25.512 PAIN IN JOINT OF LEFT SHOULDER: Primary | ICD-10-CM

## 2025-07-15 DIAGNOSIS — R29.898 WEAKNESS OF LEFT SHOULDER: ICD-10-CM

## 2025-07-15 DIAGNOSIS — M53.84 DECREASED ROM OF THORACIC SPINE: ICD-10-CM

## 2025-07-15 DIAGNOSIS — M53.82 DECREASED ROM OF INTERVERTEBRAL DISCS OF CERVICAL SPINE: ICD-10-CM

## 2025-07-15 PROCEDURE — 97112 NEUROMUSCULAR REEDUCATION: CPT

## 2025-07-15 PROCEDURE — 97140 MANUAL THERAPY 1/> REGIONS: CPT

## 2025-07-15 PROCEDURE — 97110 THERAPEUTIC EXERCISES: CPT

## 2025-07-15 NOTE — PROGRESS NOTES
"  Outpatient Rehab    Physical Therapy Visit    Patient Name: Alireza Blum  MRN: [id number]  YOB: 1960  Encounter Date: 7/15/2025    Therapy Diagnosis:   Encounter Diagnoses   Name Primary?    Pain in joint of left shoulder Yes    Weakness of left shoulder     Decreased ROM of left shoulder     Decreased ROM of intervertebral discs of cervical spine     Decreased ROM of thoracic spine      Physician: Ketan Valdivia MD    Physician Orders: Eval and Treat  Medical Diagnosis: Pain in joint, shoulder region  Surgical Diagnosis: Not applicable for this Episode   Surgical Date: Not applicable for this Episode  Days Since Last Surgery: Not applicable for this Episode    Visit # / Visits Authorized:  10 / 24  Insurance Authorization Period: 6/3/2025 to 8/29/2025  Date of Evaluation: 5/30/2025  Plan of Care Certification: 7/1/2025 to 8/12/2025      PT/PTA: PT   Number of PTA visits since last PT visit:3  Time In: 0940   Time Out: 1020  Total Time (in minutes): 40   Total Billable Time (in minutes): 39    FOTO:  Intake Score: 46%  Survey Score 2: 47%      Subjective   The pt reports he is feeling pretty good today with no complaints of shoulder pain..  Pain reported as 0/10.      Objective            Treatment:  Therapeutic Exercise  TE 1: Standing shoulder extensions, retractions, and lat pull downs (Blue) for UE strengthening  TE 2: pulley (flexion and abd) for shoulder AAROM  TE 3: supine serratus punch for shoulder stability with manual PT resistance  TE 4: seated stick flexion AAROM for shoulder ROM (5#)  Manual Therapy  MT 1: (L) GHJ mobs  MT 2: L shoulder PROM  MT 3: L shoulder perturbations at ~90, 110, and 45 degrees of L shoulder flexion with protraction (4x30")  Balance/Neuromuscular Re-Education  NMR 1: scapular re-ed with bent over retraction and extensions (6#)  NMR 2: shoulder stability re-ed with wall ball (red med ball)    Time Entry(in minutes):  Manual Therapy Time Entry: " "9  Neuromuscular Re-Education Time Entry: 15  Therapeutic Exercise Time Entry: 15    Assessment & Plan   Assessment: The pt was progressed to 2# resistance with standing T's today. He ct to progress well with LUE strength/stability, with painful ~120-180 degree arc of flexion and abduction. He would ct to benefit from further skilled PT interventions.       The patient will continue to benefit from skilled outpatient physical therapy in order to address the deficits listed in the problem list on the initial evaluation, provide patient and family education, and maximize the patients level of independence in the home and community environments.     The patient's spiritual, cultural, and educational needs were considered, and the patient is agreeable to the plan of care and goals.           Plan: Continue with current PT POC.    Goals:   Active       Functional outcome       Patient will show a significant change in FOTO patient-reported outcome tool to demonstrate subjective improvement (Progressing)       Start:  05/30/25    Expected End:  07/11/25            Patient stated goal: "I want to be able to raise my L arm with no pain."  (Progressing)       Start:  05/30/25    Expected End:  07/11/25            Patient will demonstrate independence in home program for support of progression (Progressing)       Start:  05/30/25    Expected End:  07/11/25               Pain       Patient will report L shoulder pain at worst of 1/10 demonstrating a reduction of overall pain (Progressing)       Start:  05/30/25    Expected End:  07/11/25               Range of Motion       Patient will achieve left shoulder flexion of 160 degrees (Progressing)       Start:  05/30/25    Expected End:  07/11/25            Patient will achieve left shoulder abduction of 170 degrees (Progressing)       Start:  05/30/25    Expected End:  07/11/25               Strength       Patient will achieve left shoulder flexion strength of 4+/5 (Progressing) "       Start:  05/30/25    Expected End:  07/11/25            Patient will achieve left shoulder abduction strength of 4+/5 (Progressing)       Start:  05/30/25    Expected End:  07/11/25                Jake Vazquez PT

## 2025-07-22 ENCOUNTER — CLINICAL SUPPORT (OUTPATIENT)
Dept: REHABILITATION | Facility: HOSPITAL | Age: 65
End: 2025-07-22
Payer: MEDICAID

## 2025-07-22 DIAGNOSIS — R29.898 WEAKNESS OF LEFT SHOULDER: ICD-10-CM

## 2025-07-22 DIAGNOSIS — M25.512 PAIN IN JOINT OF LEFT SHOULDER: Primary | ICD-10-CM

## 2025-07-22 DIAGNOSIS — M53.82 DECREASED ROM OF INTERVERTEBRAL DISCS OF CERVICAL SPINE: ICD-10-CM

## 2025-07-22 DIAGNOSIS — M53.84 DECREASED ROM OF THORACIC SPINE: ICD-10-CM

## 2025-07-22 DIAGNOSIS — M25.612 DECREASED ROM OF LEFT SHOULDER: ICD-10-CM

## 2025-07-22 PROCEDURE — 97140 MANUAL THERAPY 1/> REGIONS: CPT

## 2025-07-22 PROCEDURE — 97110 THERAPEUTIC EXERCISES: CPT

## 2025-07-22 PROCEDURE — 97112 NEUROMUSCULAR REEDUCATION: CPT

## 2025-07-22 NOTE — PROGRESS NOTES
"  Outpatient Rehab    Physical Therapy Visit    Patient Name: Alireza Blum  MRN: [id number]  YOB: 1960  Encounter Date: 7/22/2025    Therapy Diagnosis:   Encounter Diagnoses   Name Primary?    Pain in joint of left shoulder Yes    Weakness of left shoulder     Decreased ROM of left shoulder     Decreased ROM of intervertebral discs of cervical spine     Decreased ROM of thoracic spine      Physician: Ketan Valdivia MD    Physician Orders: Eval and Treat  Medical Diagnosis: Pain in joint, shoulder region  Surgical Diagnosis: Not applicable for this Episode   Surgical Date: Not applicable for this Episode  Days Since Last Surgery: Not applicable for this Episode    Visit # / Visits Authorized:  11 / 24  Insurance Authorization Period: 6/3/2025 to 8/29/2025  Date of Evaluation: 5/30/2025  Plan of Care Certification: 7/1/2025 to 8/12/2025      PT/PTA: PT   Number of PTA visits since last PT visit:0  Time In: 0935   Time Out: 1015  Total Time (in minutes): 40   Total Billable Time (in minutes): 40    FOTO:  Intake Score (%): 46  Survey Score 2 (%): 47      Subjective   The pt states he is having a good day with minimal shoulder discomfort..  Pain reported as 2/10. Generalized L shoulder    Objective            Treatment:  Therapeutic Exercise  TE 1: Standing shoulder extensions, retractions, and lat pull downs (Black) for UE strengthening  TE 2: pulley (flexion and abd) for shoulder AAROM  TE 3: supine serratus punch for shoulder stability with manual PT resistance  TE 4: seated stick flexion AAROM for shoulder ROM (5#)  Manual Therapy  MT 1: (L) GHJ mobs  MT 2: L shoulder PROM  MT 3: L shoulder perturbations at ~90, 110, and 45 degrees of L shoulder flexion with protraction (4x30")  Balance/Neuromuscular Re-Education  NMR 1: scapular re-ed with bent over retraction and extensions (6#)  NMR 2: shoulder stability re-ed with wall ball (yellow med ball)  NMR 3: scapular re-ed with seated 90/90 ER (2#) " "on PB    Time Entry(in minutes):  Manual Therapy Time Entry: 10  Neuromuscular Re-Education Time Entry: 15  Therapeutic Exercise Time Entry: 15    Assessment & Plan   Assessment: The pt was progressed to Yellow med ball resistance with wall ball today, Black TB resistance with retractions, shoulder extensions, and lat pull downs, and completed the new intervention of 90/90 seated shoulder ER with 2# dumbbell resistance today. He is progressing well with pain free ROM of L shoulder as well. He would ct to benefit from further skilled PT interventions.        The patient will continue to benefit from skilled outpatient physical therapy in order to address the deficits listed in the problem list on the initial evaluation, provide patient and family education, and maximize the patients level of independence in the home and community environments.     The patient's spiritual, cultural, and educational needs were considered, and the patient is agreeable to the plan of care and goals.           Plan: Continue with current PT POC.    Goals:   Active       Functional outcome       Patient will show a significant change in FOTO patient-reported outcome tool to demonstrate subjective improvement (Progressing)       Start:  05/30/25    Expected End:  07/11/25            Patient stated goal: "I want to be able to raise my L arm with no pain."  (Progressing)       Start:  05/30/25    Expected End:  07/11/25            Patient will demonstrate independence in home program for support of progression (Progressing)       Start:  05/30/25    Expected End:  07/11/25               Pain       Patient will report L shoulder pain at worst of 1/10 demonstrating a reduction of overall pain (Progressing)       Start:  05/30/25    Expected End:  07/11/25               Range of Motion       Patient will achieve left shoulder flexion of 160 degrees (Progressing)       Start:  05/30/25    Expected End:  07/11/25            Patient will achieve " left shoulder abduction of 170 degrees (Progressing)       Start:  05/30/25    Expected End:  07/11/25               Strength       Patient will achieve left shoulder flexion strength of 4+/5 (Progressing)       Start:  05/30/25    Expected End:  07/11/25            Patient will achieve left shoulder abduction strength of 4+/5 (Progressing)       Start:  05/30/25    Expected End:  07/11/25                Jake Vazquez PT

## 2025-07-24 ENCOUNTER — CLINICAL SUPPORT (OUTPATIENT)
Dept: REHABILITATION | Facility: HOSPITAL | Age: 65
End: 2025-07-24
Payer: MEDICAID

## 2025-07-24 DIAGNOSIS — M25.612 DECREASED ROM OF LEFT SHOULDER: ICD-10-CM

## 2025-07-24 DIAGNOSIS — M53.82 DECREASED ROM OF INTERVERTEBRAL DISCS OF CERVICAL SPINE: ICD-10-CM

## 2025-07-24 DIAGNOSIS — R29.898 WEAKNESS OF LEFT SHOULDER: ICD-10-CM

## 2025-07-24 DIAGNOSIS — M25.512 PAIN IN JOINT OF LEFT SHOULDER: Primary | ICD-10-CM

## 2025-07-24 DIAGNOSIS — M53.84 DECREASED ROM OF THORACIC SPINE: ICD-10-CM

## 2025-07-24 PROCEDURE — 97112 NEUROMUSCULAR REEDUCATION: CPT | Mod: CQ

## 2025-07-24 PROCEDURE — 97110 THERAPEUTIC EXERCISES: CPT | Mod: CQ

## 2025-07-24 PROCEDURE — 97140 MANUAL THERAPY 1/> REGIONS: CPT | Mod: CQ

## 2025-07-24 NOTE — PROGRESS NOTES
Outpatient Rehab    Physical Therapy Visit    Patient Name: Alireza Blum  MRN: [id number]  YOB: 1960  Encounter Date: 7/24/2025    Therapy Diagnosis:   Encounter Diagnoses   Name Primary?    Pain in joint of left shoulder Yes    Weakness of left shoulder     Decreased ROM of left shoulder     Decreased ROM of intervertebral discs of cervical spine     Decreased ROM of thoracic spine      Physician: Ketan Valdivia MD    Physician Orders: Eval and Treat  Medical Diagnosis: Pain in joint, shoulder region  Surgical Diagnosis: Not applicable for this Episode   Surgical Date: Not applicable for this Episode  Days Since Last Surgery: Not applicable for this Episode    Visit # / Visits Authorized:  12 / 24  Insurance Authorization Period: 6/3/2025 to 8/29/2025  Date of Evaluation: 5/30/2025  Plan of Care Certification: 7/1/2025 to 8/12/2025      PT/PTA: PTA   Number of PTA visits since last PT visit:1  Time In: 0930   Time Out: 1015  Total Time (in minutes): 45   Total Billable Time (in minutes): 45    FOTO:  Intake Score (%): 46  Survey Score 2 (%): 47  Survey Score 3 (%): Not applicable for this Episode    Precautions:         Subjective   Patient states his shoulder is doing well since adjustments were made to exercises a couple weeks ago..  Pain reported as 0/10. Generalized L shoulder    Objective            Treatment:  Therapeutic Exercise  TE 1: Standing shoulder extensions and lat pull downs (Blue) for UE strengthening  TE 2: pulley (flexion and abd) for shoulder AAROM  TE 3: supine serratus punch for shoulder stability  TE 4: seated stick flexion AAROM for shoulder ROM  TE 5: Supine AAROM flexion and serratus punches with dowel  Manual Therapy  MT 1: (L) GHJ mobs  MT 2: L shoulder PROM  Balance/Neuromuscular Re-Education  NMR 2: thoracic mobility re-ed with open books  NMR 3: Supine circles with weighted ball    Time Entry(in minutes):  Manual Therapy Time Entry: 10  Neuromuscular  "Re-Education Time Entry: 15  Therapeutic Exercise Time Entry: 20    Assessment & Plan   Assessment: Patient demonstrated a positive response to treatment today, as evidenced by ability to complete all exercises with good effort, and mild reports of increased pain. Continued with exercises added last session to facilitate improved scapulohumeral rhythm and shoulder stability with good tolerance. Verbal and tactile cues required for form.  Evaluation/Treatment Tolerance: Patient tolerated treatment well    The patient will continue to benefit from skilled outpatient physical therapy in order to address the deficits listed in the problem list on the initial evaluation, provide patient and family education, and maximize the patients level of independence in the home and community environments.     The patient's spiritual, cultural, and educational needs were considered, and the patient is agreeable to the plan of care and goals.           Plan: Continue with current PT POC.    Goals:   Active       Functional outcome       Patient will show a significant change in FOTO patient-reported outcome tool to demonstrate subjective improvement (Progressing)       Start:  05/30/25    Expected End:  07/11/25            Patient stated goal: "I want to be able to raise my L arm with no pain."  (Progressing)       Start:  05/30/25    Expected End:  07/11/25            Patient will demonstrate independence in home program for support of progression (Progressing)       Start:  05/30/25    Expected End:  07/11/25               Pain       Patient will report L shoulder pain at worst of 1/10 demonstrating a reduction of overall pain (Progressing)       Start:  05/30/25    Expected End:  07/11/25               Range of Motion       Patient will achieve left shoulder flexion of 160 degrees (Progressing)       Start:  05/30/25    Expected End:  07/11/25            Patient will achieve left shoulder abduction of 170 degrees (Progressing)  "      Start:  05/30/25    Expected End:  07/11/25               Strength       Patient will achieve left shoulder flexion strength of 4+/5 (Progressing)       Start:  05/30/25    Expected End:  07/11/25            Patient will achieve left shoulder abduction strength of 4+/5 (Progressing)       Start:  05/30/25    Expected End:  07/11/25                Nick Bolanos, PTA

## 2025-07-29 ENCOUNTER — CLINICAL SUPPORT (OUTPATIENT)
Dept: REHABILITATION | Facility: HOSPITAL | Age: 65
End: 2025-07-29
Payer: MEDICAID

## 2025-07-29 DIAGNOSIS — M25.512 PAIN IN JOINT OF LEFT SHOULDER: Primary | ICD-10-CM

## 2025-07-29 DIAGNOSIS — R29.898 WEAKNESS OF LEFT SHOULDER: ICD-10-CM

## 2025-07-29 DIAGNOSIS — M25.612 DECREASED ROM OF LEFT SHOULDER: ICD-10-CM

## 2025-07-29 DIAGNOSIS — M53.84 DECREASED ROM OF THORACIC SPINE: ICD-10-CM

## 2025-07-29 DIAGNOSIS — M53.82 DECREASED ROM OF INTERVERTEBRAL DISCS OF CERVICAL SPINE: ICD-10-CM

## 2025-07-29 PROCEDURE — 97140 MANUAL THERAPY 1/> REGIONS: CPT

## 2025-07-29 PROCEDURE — 97110 THERAPEUTIC EXERCISES: CPT

## 2025-07-29 PROCEDURE — 97112 NEUROMUSCULAR REEDUCATION: CPT

## 2025-07-29 NOTE — PROGRESS NOTES
Outpatient Rehab    Physical Therapy Progress Note    Patient Name: Alireza Blum  MRN: [id number]  YOB: 1960  Encounter Date: 7/29/2025    Therapy Diagnosis:   Encounter Diagnoses   Name Primary?    Pain in joint of left shoulder Yes    Weakness of left shoulder     Decreased ROM of left shoulder     Decreased ROM of intervertebral discs of cervical spine     Decreased ROM of thoracic spine      Physician: Ketan Valdivia MD    Physician Orders: Eval and Treat  Medical Diagnosis: Pain in joint, shoulder region  Surgical Diagnosis: Not applicable for this Episode   Surgical Date: Not applicable for this Episode  Days Since Last Surgery: Not applicable for this Episode    Visit # / Visits Authorized:  13 / 24  Insurance Authorization Period: 6/3/2025 to 8/29/2025  Date of Evaluation: 5/30/2025  Plan of Care Certification: 7/1/2025 to 8/12/2025      PT/PTA: PT   Number of PTA visits since last PT visit:1  Time In: 0931   Time Out: 1015  Total Time (in minutes): 44   Total Billable Time (in minutes): 40    FOTO:  Intake Score (%): 46  Survey Score 2 (%): 47  Survey Score 3 (%): 51      Subjective   The pt states he is feeling good today with minimal discomfort to his L shoulder..  Pain reported as 1/10. Generalized L shoulder    Objective      Shoulder Range of Motion  Right Shoulder   Active (deg) Passive (deg) Pain   Flexion 175 180     Extension         Scaption         ABduction 180 183     ADduction         Horizontal ABduction         Horizontal ADduction         External Rotation (Shoulder ABducted 0 degrees)         External Rotation (Shoulder ABducted 45 degrees)         External Rotation (Shoulder ABducted 90 degrees)         Internal Rotation (Shoulder ABducted 0 degrees)         Internal Rotation (Shoulder ABducted 45 degrees)         Internal Rotation (Shoulder ABducted 90 degrees)           Left Shoulder   Active (deg) Passive (deg) Pain   Flexion 135 178 Yes   Extension        "  Scaption         ABduction 135 180 Yes   ADduction         Horizontal ABduction         Horizontal ADduction         External Rotation (Shoulder ABducted 0 degrees)   82     External Rotation (Shoulder ABducted 45 degrees)         External Rotation (Shoulder ABducted 90 degrees)         Internal Rotation (Shoulder ABducted 0 degrees)   66     Internal Rotation (Shoulder ABducted 45 degrees)         Internal Rotation (Shoulder ABducted 90 degrees)                       Shoulder Strength - Planes of Motion   Right Strength Right Pain Left Strength Left  Pain   Flexion 4   4     Extension           ABduction 4   3+     ADduction           Horizontal ABduction           Horizontal ADduction           Internal Rotation 0° 4+   4     Internal Rotation 90°           External Rotation 0° 4   3+     External Rotation 90°               Shoulder Strength - Scapular Stabilizing Muscles   Right Strength Right Pain Left Strength Left  Pain   Lower Trapezius 3+   3     Middle Trapezius 3+   3+     Rhomboids 3+   3                    Treatment:  Therapeutic Exercise  TE 1: Standing shoulder extensions and lat pull downs (Blue) for UE strengthening  TE 2: pulley (flexion and abd) for shoulder AAROM  TE 3: supine serratus punch for shoulder stability  TE 4: seated stick flexion AAROM for shoulder ROM  Manual Therapy  MT 1: (L) GHJ mobs  MT 2: L shoulder PROM  MT 3: L shoulder perturbations at ~90, 110, and 45 degrees of L shoulder flexion with protraction (4x30")  Balance/Neuromuscular Re-Education  NMR 1: scapular re-ed with bent over retraction and extensions (6#)  NMR 2: shoulder stability re-ed with wall ball (yellow med ball)  NMR 3: scapular strengthening re-ed with seated 90/90 ER (2#)    Time Entry(in minutes):  Manual Therapy Time Entry: 10  Neuromuscular Re-Education Time Entry: 15  Therapeutic Exercise Time Entry: 15    Assessment & Plan   Assessment: The pt ct to have good tolerance with therex with slight tactile and " "verbal cueing to scapula required from PT during bent over T's and shoulder extensions today to correctly complete these interventions. He ct to progress well with L shoulder ROM and strength. He would ct to benefit from further skilled PT interventions.        The patient will continue to benefit from skilled outpatient physical therapy in order to address the deficits listed in the problem list on the initial evaluation, provide patient and family education, and maximize the patients level of independence in the home and community environments.     The patient's spiritual, cultural, and educational needs were considered, and the patient is agreeable to the plan of care and goals.           Plan: Continue with current PT POC.    Goals:   Active       Functional outcome       Patient will show a significant change in FOTO patient-reported outcome tool to demonstrate subjective improvement (Progressing)       Start:  05/30/25    Expected End:  08/29/25            Patient stated goal: "I want to be able to raise my L arm with no pain."  (Progressing)       Start:  05/30/25    Expected End:  08/29/25            Patient will demonstrate independence in home program for support of progression (Progressing)       Start:  05/30/25    Expected End:  08/29/25               Pain       Patient will report L shoulder pain at worst of 1/10 demonstrating a reduction of overall pain (Progressing)       Start:  05/30/25    Expected End:  08/29/25               Range of Motion       Patient will achieve left shoulder flexion of 160 degrees (Progressing)       Start:  05/30/25    Expected End:  08/29/25            Patient will achieve left shoulder abduction of 170 degrees (Progressing)       Start:  05/30/25    Expected End:  08/29/25               Strength       Patient will achieve left shoulder flexion strength of 4+/5 (Progressing)       Start:  05/30/25    Expected End:  08/29/25            Patient will achieve left shoulder " abduction strength of 4+/5 (Progressing)       Start:  05/30/25    Expected End:  08/29/25                Jake Vazquez PT

## 2025-08-05 DIAGNOSIS — Z87.891 PERSONAL HISTORY OF TOBACCO USE, PRESENTING HAZARDS TO HEALTH: Primary | ICD-10-CM

## 2025-08-12 ENCOUNTER — CLINICAL SUPPORT (OUTPATIENT)
Dept: REHABILITATION | Facility: HOSPITAL | Age: 65
End: 2025-08-12
Payer: MEDICAID

## 2025-08-12 DIAGNOSIS — M25.512 PAIN IN JOINT OF LEFT SHOULDER: Primary | ICD-10-CM

## 2025-08-12 DIAGNOSIS — M53.82 DECREASED ROM OF INTERVERTEBRAL DISCS OF CERVICAL SPINE: ICD-10-CM

## 2025-08-12 DIAGNOSIS — M53.84 DECREASED ROM OF THORACIC SPINE: ICD-10-CM

## 2025-08-12 DIAGNOSIS — R29.898 WEAKNESS OF LEFT SHOULDER: ICD-10-CM

## 2025-08-12 DIAGNOSIS — M25.612 DECREASED ROM OF LEFT SHOULDER: ICD-10-CM

## 2025-08-12 PROCEDURE — 97112 NEUROMUSCULAR REEDUCATION: CPT | Mod: CQ

## 2025-08-12 PROCEDURE — 97110 THERAPEUTIC EXERCISES: CPT | Mod: CQ

## 2025-08-14 ENCOUNTER — CLINICAL SUPPORT (OUTPATIENT)
Dept: REHABILITATION | Facility: HOSPITAL | Age: 65
End: 2025-08-14
Payer: MEDICAID

## 2025-08-14 ENCOUNTER — HOSPITAL ENCOUNTER (OUTPATIENT)
Dept: RADIOLOGY | Facility: HOSPITAL | Age: 65
Discharge: HOME | End: 2025-08-14
Attending: FAMILY MEDICINE
Payer: MEDICAID

## 2025-08-14 DIAGNOSIS — R29.898 WEAKNESS OF LEFT SHOULDER: ICD-10-CM

## 2025-08-14 DIAGNOSIS — Z87.891 PERSONAL HISTORY OF TOBACCO USE, PRESENTING HAZARDS TO HEALTH: ICD-10-CM

## 2025-08-14 DIAGNOSIS — M53.82 DECREASED ROM OF INTERVERTEBRAL DISCS OF CERVICAL SPINE: ICD-10-CM

## 2025-08-14 DIAGNOSIS — M53.84 DECREASED ROM OF THORACIC SPINE: ICD-10-CM

## 2025-08-14 DIAGNOSIS — M25.512 PAIN IN JOINT OF LEFT SHOULDER: Primary | ICD-10-CM

## 2025-08-14 DIAGNOSIS — M25.612 DECREASED ROM OF LEFT SHOULDER: ICD-10-CM

## 2025-08-14 PROCEDURE — 97140 MANUAL THERAPY 1/> REGIONS: CPT

## 2025-08-14 PROCEDURE — 97112 NEUROMUSCULAR REEDUCATION: CPT

## 2025-08-14 PROCEDURE — 97110 THERAPEUTIC EXERCISES: CPT

## 2025-08-14 PROCEDURE — 71271 CT THORAX LUNG CANCER SCR C-: CPT | Mod: TC

## 2025-08-19 ENCOUNTER — LAB VISIT (OUTPATIENT)
Dept: LAB | Facility: HOSPITAL | Age: 65
End: 2025-08-19
Attending: FAMILY MEDICINE
Payer: MEDICAID

## 2025-08-19 ENCOUNTER — CLINICAL SUPPORT (OUTPATIENT)
Dept: REHABILITATION | Facility: HOSPITAL | Age: 65
End: 2025-08-19
Payer: MEDICAID

## 2025-08-19 DIAGNOSIS — M25.612 DECREASED ROM OF LEFT SHOULDER: ICD-10-CM

## 2025-08-19 DIAGNOSIS — G62.9 PERIPHERAL NERVE DISORDER: ICD-10-CM

## 2025-08-19 DIAGNOSIS — I48.91 ATRIAL FIBRILLATION: ICD-10-CM

## 2025-08-19 DIAGNOSIS — E11.9 DIABETES MELLITUS WITHOUT COMPLICATION: ICD-10-CM

## 2025-08-19 DIAGNOSIS — M53.84 DECREASED ROM OF THORACIC SPINE: ICD-10-CM

## 2025-08-19 DIAGNOSIS — Z12.5 SPECIAL SCREENING FOR MALIGNANT NEOPLASM OF PROSTATE: ICD-10-CM

## 2025-08-19 DIAGNOSIS — K63.5 HYPERPLASTIC POLYP OF INTESTINE: ICD-10-CM

## 2025-08-19 DIAGNOSIS — I10 ESSENTIAL HYPERTENSION, MALIGNANT: Primary | ICD-10-CM

## 2025-08-19 DIAGNOSIS — R29.898 WEAKNESS OF LEFT SHOULDER: ICD-10-CM

## 2025-08-19 DIAGNOSIS — M53.82 DECREASED ROM OF INTERVERTEBRAL DISCS OF CERVICAL SPINE: ICD-10-CM

## 2025-08-19 DIAGNOSIS — M25.512 PAIN IN JOINT OF LEFT SHOULDER: Primary | ICD-10-CM

## 2025-08-19 LAB
ALBUMIN SERPL-MCNC: 4 G/DL (ref 3.4–4.8)
ALBUMIN/GLOB SERPL: 1.1 RATIO (ref 1.1–2)
ALP SERPL-CCNC: 58 UNIT/L (ref 40–150)
ALT SERPL-CCNC: 19 UNIT/L (ref 0–55)
ANION GAP SERPL CALC-SCNC: 6 MEQ/L
AST SERPL-CCNC: 16 UNIT/L (ref 11–45)
BACTERIA #/AREA URNS AUTO: ABNORMAL /HPF
BILIRUB SERPL-MCNC: 0.6 MG/DL
BILIRUB UR QL STRIP.AUTO: NEGATIVE
BUN SERPL-MCNC: 26.7 MG/DL (ref 8.4–25.7)
CALCIUM SERPL-MCNC: 9 MG/DL (ref 8.8–10)
CHLORIDE SERPL-SCNC: 107 MMOL/L (ref 98–107)
CHOLEST SERPL-MCNC: 94 MG/DL
CHOLEST/HDLC SERPL: 3 {RATIO} (ref 0–5)
CLARITY UR: CLEAR
CO2 SERPL-SCNC: 29 MMOL/L (ref 23–31)
COLOR UR AUTO: YELLOW
CREAT SERPL-MCNC: 0.91 MG/DL (ref 0.72–1.25)
CREAT/UREA NIT SERPL: 29
ERYTHROCYTE [DISTWIDTH] IN BLOOD BY AUTOMATED COUNT: 13.3 % (ref 11.5–17)
EST. AVERAGE GLUCOSE BLD GHB EST-MCNC: 137 MG/DL
GFR SERPLBLD CREATININE-BSD FMLA CKD-EPI: >60 ML/MIN/1.73/M2
GLOBULIN SER-MCNC: 3.5 GM/DL (ref 2.4–3.5)
GLUCOSE SERPL-MCNC: 161 MG/DL (ref 82–115)
GLUCOSE UR QL STRIP: >=1000
HBA1C MFR BLD: 6.4 %
HCT VFR BLD AUTO: 51 % (ref 42–52)
HDLC SERPL-MCNC: 35 MG/DL (ref 35–60)
HGB BLD-MCNC: 16.7 G/DL (ref 14–18)
HGB UR QL STRIP: NEGATIVE
KETONES UR QL STRIP: NEGATIVE
LDLC SERPL CALC-MCNC: 40 MG/DL (ref 50–140)
LEUKOCYTE ESTERASE UR QL STRIP: NEGATIVE
MCH RBC QN AUTO: 29.9 PG (ref 27–31)
MCHC RBC AUTO-ENTMCNC: 32.7 G/DL (ref 33–36)
MCV RBC AUTO: 91.4 FL (ref 80–94)
NITRITE UR QL STRIP: NEGATIVE
PH UR STRIP: 6 [PH]
PLATELET # BLD AUTO: 529 X10(3)/MCL (ref 130–400)
PMV BLD AUTO: 10.1 FL (ref 7.4–10.4)
POTASSIUM SERPL-SCNC: 4.4 MMOL/L (ref 3.5–5.1)
PROT SERPL-MCNC: 7.5 GM/DL (ref 5.8–7.6)
PROT UR QL STRIP: ABNORMAL
PSA SERPL-MCNC: 0.79 NG/ML
RBC # BLD AUTO: 5.58 X10(6)/MCL (ref 4.7–6.1)
RBC #/AREA URNS AUTO: ABNORMAL /HPF
SODIUM SERPL-SCNC: 142 MMOL/L (ref 136–145)
SP GR UR STRIP.AUTO: 1.02 (ref 1–1.03)
SQUAMOUS #/AREA URNS AUTO: ABNORMAL /HPF
TRIGL SERPL-MCNC: 97 MG/DL (ref 34–140)
TSH SERPL-ACNC: 1.72 UIU/ML (ref 0.35–4.94)
UROBILINOGEN UR STRIP-ACNC: 0.2
VLDLC SERPL CALC-MCNC: 19 MG/DL
WBC # BLD AUTO: 15.39 X10(3)/MCL (ref 4.5–11.5)
WBC #/AREA URNS AUTO: ABNORMAL /HPF

## 2025-08-19 PROCEDURE — 83036 HEMOGLOBIN GLYCOSYLATED A1C: CPT

## 2025-08-19 PROCEDURE — 80061 LIPID PANEL: CPT

## 2025-08-19 PROCEDURE — 84443 ASSAY THYROID STIM HORMONE: CPT

## 2025-08-19 PROCEDURE — 84153 ASSAY OF PSA TOTAL: CPT

## 2025-08-19 PROCEDURE — 81003 URINALYSIS AUTO W/O SCOPE: CPT

## 2025-08-19 PROCEDURE — 97140 MANUAL THERAPY 1/> REGIONS: CPT | Mod: CQ

## 2025-08-19 PROCEDURE — 97112 NEUROMUSCULAR REEDUCATION: CPT | Mod: CQ

## 2025-08-19 PROCEDURE — 85027 COMPLETE CBC AUTOMATED: CPT

## 2025-08-19 PROCEDURE — 36415 COLL VENOUS BLD VENIPUNCTURE: CPT

## 2025-08-19 PROCEDURE — 80053 COMPREHEN METABOLIC PANEL: CPT

## 2025-08-19 PROCEDURE — 97110 THERAPEUTIC EXERCISES: CPT | Mod: CQ

## 2025-08-21 ENCOUNTER — CLINICAL SUPPORT (OUTPATIENT)
Dept: REHABILITATION | Facility: HOSPITAL | Age: 65
End: 2025-08-21
Payer: MEDICAID

## 2025-08-21 DIAGNOSIS — M25.512 PAIN IN JOINT OF LEFT SHOULDER: Primary | ICD-10-CM

## 2025-08-21 DIAGNOSIS — M25.612 DECREASED ROM OF LEFT SHOULDER: ICD-10-CM

## 2025-08-21 DIAGNOSIS — R29.898 WEAKNESS OF LEFT SHOULDER: ICD-10-CM

## 2025-08-21 DIAGNOSIS — M53.82 DECREASED ROM OF INTERVERTEBRAL DISCS OF CERVICAL SPINE: ICD-10-CM

## 2025-08-21 DIAGNOSIS — M53.84 DECREASED ROM OF THORACIC SPINE: ICD-10-CM

## 2025-08-21 PROCEDURE — 97112 NEUROMUSCULAR REEDUCATION: CPT

## 2025-08-21 PROCEDURE — 97110 THERAPEUTIC EXERCISES: CPT

## 2025-08-26 ENCOUNTER — CLINICAL SUPPORT (OUTPATIENT)
Dept: REHABILITATION | Facility: HOSPITAL | Age: 65
End: 2025-08-26
Payer: MEDICAID

## 2025-08-26 DIAGNOSIS — R29.898 WEAKNESS OF LEFT SHOULDER: ICD-10-CM

## 2025-08-26 DIAGNOSIS — M53.84 DECREASED ROM OF THORACIC SPINE: ICD-10-CM

## 2025-08-26 DIAGNOSIS — M53.82 DECREASED ROM OF INTERVERTEBRAL DISCS OF CERVICAL SPINE: ICD-10-CM

## 2025-08-26 DIAGNOSIS — M25.512 PAIN IN JOINT OF LEFT SHOULDER: Primary | ICD-10-CM

## 2025-08-26 DIAGNOSIS — M25.612 DECREASED ROM OF LEFT SHOULDER: ICD-10-CM

## 2025-08-26 PROCEDURE — 97140 MANUAL THERAPY 1/> REGIONS: CPT | Mod: CQ

## 2025-08-26 PROCEDURE — 97112 NEUROMUSCULAR REEDUCATION: CPT | Mod: CQ

## 2025-08-26 PROCEDURE — 97110 THERAPEUTIC EXERCISES: CPT | Mod: CQ
